# Patient Record
Sex: MALE | Race: OTHER | NOT HISPANIC OR LATINO | URBAN - METROPOLITAN AREA
[De-identification: names, ages, dates, MRNs, and addresses within clinical notes are randomized per-mention and may not be internally consistent; named-entity substitution may affect disease eponyms.]

---

## 2018-11-09 VITALS
HEIGHT: 67 IN | TEMPERATURE: 98 F | HEART RATE: 75 BPM | SYSTOLIC BLOOD PRESSURE: 129 MMHG | DIASTOLIC BLOOD PRESSURE: 69 MMHG | RESPIRATION RATE: 16 BRPM | WEIGHT: 179.02 LBS | OXYGEN SATURATION: 93 %

## 2018-11-09 NOTE — H&P ADULT - NSHPLABSRESULTS_GEN_ALL_CORE
EKG: EKG: NSR @ 70 BPM with TWI in AVF                          15.0   7.6   )-----------( 239      ( 12 Nov 2018 16:22 )             43.7 EKG: NSR @ 70 BPM with TWI in AVF                          15.0   7.6   )-----------( 239      ( 12 Nov 2018 16:22 )             43.7    11-12    137  |  97  |  22  ----------------------------<  120<H>  4.0   |  26  |  1.20    Ca    9.5      12 Nov 2018 16:22    TPro  7.7  /  Alb  4.3  /  TBili  0.4  /  DBili  <0.2  /  AST  12  /  ALT  14  /  AlkPhos  66  11-12      PT/INR - ( 12 Nov 2018 16:22 )   PT: 11.5 sec;   INR: 1.02          PTT - ( 12 Nov 2018 16:22 )  PTT:34.0 sec    CARDIAC MARKERS ( 12 Nov 2018 16:22 )  x     / x     / 71 U/L / x     / 1.2 ng/mL

## 2018-11-09 NOTE — H&P ADULT - HISTORY OF PRESENT ILLNESS
**SKELETON**    68 year old non-compliant M with PMHx HTN, Hyperlipidemia, DM Type II who presented to cardiologist Dr. Jerry c/o MAGGIE.     Echocardiogram 9/18 revealed estimated LVEF 55-60%, Grade 1 Diastolic Dysfunction, mild left atrial enlargement, nodular calcification of the aortic valve, no AS, no AR.     Stress Echo 10/2018 revealed hypokinesis in the mid -basal anterolateral wall at peak stress, horizontal 1mm ST depression in the inferolateral leads, normal LV systolic function. 68 year old non-compliant M with PMHx HTN, Hyperlipidemia, DM Type II, h/o CVA in 2007 & 8/2017 without residual deficit who presented to cardiologist Dr. Jerry c/o AVTAR upon ambulation of <1 city block over past year. Pt also endorses intermittent generalized feeling of fatigue and 1 episode of PND 3 weeks ago that resolved after getting up and drinking glass of water. Pt moved from [place] 2/2018. Denies CP, dizziness, diaphoresis, LE edema, orthopnea, syncope, N/V, abdominal pain.    Echocardiogram 9/18 revealed estimated LVEF 55-60%, Grade 1 Diastolic Dysfunction, mild left atrial enlargement, nodular calcification of the aortic valve, no AS, no AR.     Stress Echo 10/2018 revealed hypokinesis in the mid -basal anterolateral wall at peak stress, horizontal 1mm ST depression in the inferolateral leads, normal LV systolic function.     In light of pt's risk factors, CCS Class III Equivalent Sx, pt referred for cardiac cath with possible intervention to r/o underlying CAD.

## 2018-11-09 NOTE — H&P ADULT - NSHPSOCIALHISTORY_GEN_ALL_CORE
Former smoker (Smoked for 1 year, 30 years ago, 20 cigarettes per day)  Denies alcohol/elicit drug use

## 2018-11-09 NOTE — H&P ADULT - PMH
CVA (cerebral vascular accident)    DM (diabetes mellitus)    HLD (hyperlipidemia)    HTN (hypertension)

## 2018-11-12 ENCOUNTER — OUTPATIENT (OUTPATIENT)
Dept: OUTPATIENT SERVICES | Facility: HOSPITAL | Age: 68
LOS: 1 days | Discharge: NURSING, REHAB OR LONG-TERM CARE | End: 2018-11-12
Payer: COMMERCIAL

## 2018-11-12 LAB
ALBUMIN SERPL ELPH-MCNC: 4.3 G/DL — SIGNIFICANT CHANGE UP (ref 3.3–5)
ALP SERPL-CCNC: 66 U/L — SIGNIFICANT CHANGE UP (ref 40–120)
ALT FLD-CCNC: 14 U/L — SIGNIFICANT CHANGE UP (ref 10–45)
ANION GAP SERPL CALC-SCNC: 14 MMOL/L — SIGNIFICANT CHANGE UP (ref 5–17)
APTT BLD: 34 SEC — SIGNIFICANT CHANGE UP (ref 27.5–36.3)
AST SERPL-CCNC: 12 U/L — SIGNIFICANT CHANGE UP (ref 10–40)
BASOPHILS NFR BLD AUTO: 0.4 % — SIGNIFICANT CHANGE UP (ref 0–2)
BILIRUB DIRECT SERPL-MCNC: <0.2 MG/DL — SIGNIFICANT CHANGE UP (ref 0–0.2)
BILIRUB INDIRECT FLD-MCNC: >0.2 MG/DL — SIGNIFICANT CHANGE UP (ref 0.2–1)
BILIRUB SERPL-MCNC: 0.4 MG/DL — SIGNIFICANT CHANGE UP (ref 0.2–1.2)
BLD GP AB SCN SERPL QL: NEGATIVE — SIGNIFICANT CHANGE UP
BUN SERPL-MCNC: 22 MG/DL — SIGNIFICANT CHANGE UP (ref 7–23)
CALCIUM SERPL-MCNC: 9.5 MG/DL — SIGNIFICANT CHANGE UP (ref 8.4–10.5)
CHLORIDE SERPL-SCNC: 97 MMOL/L — SIGNIFICANT CHANGE UP (ref 96–108)
CHOLEST SERPL-MCNC: 136 MG/DL — SIGNIFICANT CHANGE UP (ref 10–199)
CK MB CFR SERPL CALC: 1.2 NG/ML — SIGNIFICANT CHANGE UP (ref 0–6.7)
CK SERPL-CCNC: 71 U/L — SIGNIFICANT CHANGE UP (ref 30–200)
CO2 SERPL-SCNC: 26 MMOL/L — SIGNIFICANT CHANGE UP (ref 22–31)
CREAT SERPL-MCNC: 1.2 MG/DL — SIGNIFICANT CHANGE UP (ref 0.5–1.3)
CRP SERPL-MCNC: 0.24 MG/DL — SIGNIFICANT CHANGE UP (ref 0–0.4)
EOSINOPHIL NFR BLD AUTO: 3.4 % — SIGNIFICANT CHANGE UP (ref 0–6)
GLUCOSE SERPL-MCNC: 120 MG/DL — HIGH (ref 70–99)
HBA1C BLD-MCNC: 10.3 % — HIGH (ref 4–5.6)
HCT VFR BLD CALC: 43.7 % — SIGNIFICANT CHANGE UP (ref 39–50)
HDLC SERPL-MCNC: 41 MG/DL — SIGNIFICANT CHANGE UP
HGB BLD-MCNC: 15 G/DL — SIGNIFICANT CHANGE UP (ref 13–17)
INR BLD: 1.02 — SIGNIFICANT CHANGE UP (ref 0.88–1.16)
LIPID PNL WITH DIRECT LDL SERPL: 58 MG/DL — SIGNIFICANT CHANGE UP
LYMPHOCYTES # BLD AUTO: 23.7 % — SIGNIFICANT CHANGE UP (ref 13–44)
MCHC RBC-ENTMCNC: 29.9 PG — SIGNIFICANT CHANGE UP (ref 27–34)
MCHC RBC-ENTMCNC: 34.3 G/DL — SIGNIFICANT CHANGE UP (ref 32–36)
MCV RBC AUTO: 87.2 FL — SIGNIFICANT CHANGE UP (ref 80–100)
MONOCYTES NFR BLD AUTO: 11.5 % — SIGNIFICANT CHANGE UP (ref 2–14)
NEUTROPHILS NFR BLD AUTO: 61 % — SIGNIFICANT CHANGE UP (ref 43–77)
PLATELET # BLD AUTO: 239 K/UL — SIGNIFICANT CHANGE UP (ref 150–400)
POTASSIUM SERPL-MCNC: 4 MMOL/L — SIGNIFICANT CHANGE UP (ref 3.5–5.3)
POTASSIUM SERPL-SCNC: 4 MMOL/L — SIGNIFICANT CHANGE UP (ref 3.5–5.3)
PROT SERPL-MCNC: 7.7 G/DL — SIGNIFICANT CHANGE UP (ref 6–8.3)
PROTHROM AB SERPL-ACNC: 11.5 SEC — SIGNIFICANT CHANGE UP (ref 10–12.9)
RBC # BLD: 5.01 M/UL — SIGNIFICANT CHANGE UP (ref 4.2–5.8)
RBC # FLD: 12.2 % — SIGNIFICANT CHANGE UP (ref 10.3–16.9)
RH IG SCN BLD-IMP: POSITIVE — SIGNIFICANT CHANGE UP
SODIUM SERPL-SCNC: 137 MMOL/L — SIGNIFICANT CHANGE UP (ref 135–145)
TOTAL CHOLESTEROL/HDL RATIO MEASUREMENT: 3.3 RATIO — LOW (ref 3.4–9.6)
TRIGL SERPL-MCNC: 187 MG/DL — HIGH (ref 10–149)
WBC # BLD: 7.6 K/UL — SIGNIFICANT CHANGE UP (ref 3.8–10.5)
WBC # FLD AUTO: 7.6 K/UL — SIGNIFICANT CHANGE UP (ref 3.8–10.5)

## 2018-11-12 PROCEDURE — 93010 ELECTROCARDIOGRAM REPORT: CPT

## 2018-11-12 PROCEDURE — 93880 EXTRACRANIAL BILAT STUDY: CPT | Mod: 26

## 2018-11-12 PROCEDURE — 71045 X-RAY EXAM CHEST 1 VIEW: CPT | Mod: 26

## 2018-11-12 PROCEDURE — 93306 TTE W/DOPPLER COMPLETE: CPT | Mod: 26

## 2018-11-12 RX ORDER — SODIUM CHLORIDE 9 MG/ML
500 INJECTION INTRAMUSCULAR; INTRAVENOUS; SUBCUTANEOUS
Qty: 0 | Refills: 0 | Status: DISCONTINUED | OUTPATIENT
Start: 2018-11-12 | End: 2018-11-12

## 2018-11-12 RX ORDER — METOPROLOL TARTRATE 50 MG
1 TABLET ORAL
Qty: 0 | Refills: 0 | COMMUNITY

## 2018-11-12 RX ORDER — ASPIRIN/CALCIUM CARB/MAGNESIUM 324 MG
1 TABLET ORAL
Qty: 0 | Refills: 0 | COMMUNITY

## 2018-11-12 RX ORDER — ASPIRIN/CALCIUM CARB/MAGNESIUM 324 MG
81 TABLET ORAL ONCE
Qty: 0 | Refills: 0 | Status: COMPLETED | OUTPATIENT
Start: 2018-11-12 | End: 2018-11-12

## 2018-11-12 RX ORDER — CLOPIDOGREL BISULFATE 75 MG/1
600 TABLET, FILM COATED ORAL ONCE
Qty: 0 | Refills: 0 | Status: COMPLETED | OUTPATIENT
Start: 2018-11-12 | End: 2018-11-12

## 2018-11-12 RX ORDER — INSULIN LISPRO 100/ML
VIAL (ML) SUBCUTANEOUS ONCE
Qty: 0 | Refills: 0 | Status: DISCONTINUED | OUTPATIENT
Start: 2018-11-12 | End: 2018-11-12

## 2018-11-12 RX ORDER — SODIUM CHLORIDE 9 MG/ML
1000 INJECTION, SOLUTION INTRAVENOUS
Qty: 0 | Refills: 0 | Status: DISCONTINUED | OUTPATIENT
Start: 2018-11-12 | End: 2018-11-12

## 2018-11-12 RX ORDER — LOSARTAN/HYDROCHLOROTHIAZIDE 100MG-25MG
1 TABLET ORAL
Qty: 0 | Refills: 0 | COMMUNITY

## 2018-11-12 RX ADMIN — SODIUM CHLORIDE 75 MILLILITER(S): 9 INJECTION INTRAMUSCULAR; INTRAVENOUS; SUBCUTANEOUS at 17:01

## 2018-11-12 RX ADMIN — CLOPIDOGREL BISULFATE 600 MILLIGRAM(S): 75 TABLET, FILM COATED ORAL at 17:00

## 2018-11-12 RX ADMIN — Medication 81 MILLIGRAM(S): at 17:00

## 2018-11-12 NOTE — PROGRESS NOTE ADULT - SUBJECTIVE AND OBJECTIVE BOX
Interventional Cardiology PA SDA Discharge Note    Patient without complaints. Ambulated and voided without difficulties    Afebrile, VSS    Ext:    		  		Right              Radial : no   hematoma,   no  bleeding, dressing; C/D/I      Pulses:    intact RAD to baseline     A/P:  68 year old non-compliant M with PMHx HTN, Hyperlipidemia, DM Type II, h/o CVA in 2007 & 8/2017 without residual deficit who presented to cardiologist Dr. Jerry c/o NOVA upon ambulation of <1 city block over past year. Pt also endorses intermittent generalized feeling of fatigue and 1 episode of PND 3 weeks ago that resolved after getting up and drinking glass of water. Pt moved from [facility] 2/2018. Denies CP, dizziness, diaphoresis, LE edema, orthopnea, syncope, N/V, abdominal pain. Echocardiogram 9/18 revealed estimated LVEF 55-60%, Grade 1 Diastolic Dysfunction, mild left atrial enlargement, nodular calcification of the aortic valve, no AS, no AR. Stress Echo 10/2018 revealed hypokinesis in the mid -basal anterolateral wall at peak stress, horizontal 1mm ST depression in the inferolateral leads, normal LV systolic function. In light of pt's risk factors, CCS Class III Equivalent Sx, pt was referred for cardiac cath with possible intervention to r/o underlying CAD. Patient underwent cardiac cath on 11/12/18 revealed proxLAD 70%, midLAD 80%, distalLAD mild luminal irreg, D1 85%, ostial D2 90%, proxLCX distal  fills via collaterals, RCA mild atherosclerosis. Dr. Gallo called for CTS eval and then patient to be discharged home as discussed with Dr. Jerry.                1.	Stable for discharge as per attending Dr. Jerry after bed rest, pt voids, groin/wrist stable and 30 minutes of ambulation.  2.	Follow-up with PMD/Cardiologist Dr. Jerry on Thursday 11/15  3.	Discharged forms signed and copies in chart

## 2018-11-13 PROBLEM — Z00.00 ENCOUNTER FOR PREVENTIVE HEALTH EXAMINATION: Status: ACTIVE | Noted: 2018-11-13

## 2018-11-13 PROCEDURE — 80061 LIPID PANEL: CPT

## 2018-11-13 PROCEDURE — 86140 C-REACTIVE PROTEIN: CPT

## 2018-11-13 PROCEDURE — 85610 PROTHROMBIN TIME: CPT

## 2018-11-13 PROCEDURE — 80076 HEPATIC FUNCTION PANEL: CPT

## 2018-11-13 PROCEDURE — 86900 BLOOD TYPING SEROLOGIC ABO: CPT

## 2018-11-13 PROCEDURE — 85025 COMPLETE CBC W/AUTO DIFF WBC: CPT

## 2018-11-13 PROCEDURE — C1887: CPT

## 2018-11-13 PROCEDURE — 86850 RBC ANTIBODY SCREEN: CPT

## 2018-11-13 PROCEDURE — 82553 CREATINE MB FRACTION: CPT

## 2018-11-13 PROCEDURE — 93306 TTE W/DOPPLER COMPLETE: CPT

## 2018-11-13 PROCEDURE — 93880 EXTRACRANIAL BILAT STUDY: CPT

## 2018-11-13 PROCEDURE — 80048 BASIC METABOLIC PNL TOTAL CA: CPT

## 2018-11-13 PROCEDURE — 71045 X-RAY EXAM CHEST 1 VIEW: CPT

## 2018-11-13 PROCEDURE — C1769: CPT

## 2018-11-13 PROCEDURE — 83036 HEMOGLOBIN GLYCOSYLATED A1C: CPT

## 2018-11-13 PROCEDURE — 93458 L HRT ARTERY/VENTRICLE ANGIO: CPT

## 2018-11-13 PROCEDURE — 85730 THROMBOPLASTIN TIME PARTIAL: CPT

## 2018-11-13 PROCEDURE — 82550 ASSAY OF CK (CPK): CPT

## 2018-11-13 PROCEDURE — 82962 GLUCOSE BLOOD TEST: CPT

## 2018-11-13 PROCEDURE — C1894: CPT

## 2018-11-13 PROCEDURE — 93005 ELECTROCARDIOGRAM TRACING: CPT

## 2018-11-13 PROCEDURE — 86901 BLOOD TYPING SEROLOGIC RH(D): CPT

## 2018-11-14 VITALS — WEIGHT: 179 LBS | BODY MASS INDEX: 28.09 KG/M2 | HEIGHT: 67 IN

## 2018-11-14 PROBLEM — I63.9 CEREBRAL INFARCTION, UNSPECIFIED: Chronic | Status: ACTIVE | Noted: 2018-11-12

## 2018-11-14 PROBLEM — E78.5 HYPERLIPIDEMIA, UNSPECIFIED: Chronic | Status: ACTIVE | Noted: 2018-11-12

## 2018-11-14 PROBLEM — I10 ESSENTIAL (PRIMARY) HYPERTENSION: Chronic | Status: ACTIVE | Noted: 2018-11-12

## 2018-11-14 PROBLEM — I25.10 CAD (CORONARY ARTERY DISEASE): Status: ACTIVE | Noted: 2018-11-14

## 2018-11-14 PROBLEM — E11.9 TYPE 2 DIABETES MELLITUS WITHOUT COMPLICATIONS: Chronic | Status: ACTIVE | Noted: 2018-11-12

## 2018-11-15 VITALS
DIASTOLIC BLOOD PRESSURE: 69 MMHG | RESPIRATION RATE: 18 BRPM | HEART RATE: 75 BPM | TEMPERATURE: 98 F | SYSTOLIC BLOOD PRESSURE: 129 MMHG | OXYGEN SATURATION: 93 % | HEIGHT: 67 IN | WEIGHT: 179.02 LBS

## 2018-11-15 RX ORDER — ATORVASTATIN CALCIUM 20 MG/1
20 TABLET, FILM COATED ORAL
Qty: 30 | Refills: 3 | Status: ACTIVE | COMMUNITY

## 2018-11-15 RX ORDER — MULTIVITAMIN
TABLET ORAL DAILY
Refills: 0 | Status: ACTIVE | COMMUNITY

## 2018-11-15 RX ORDER — ASPIRIN 81 MG
81 TABLET, DELAYED RELEASE (ENTERIC COATED) ORAL DAILY
Qty: 30 | Refills: 0 | Status: ACTIVE | COMMUNITY

## 2018-11-15 NOTE — H&P ADULT - ASSESSMENT
68 year old Male, non-compliant with medical care with a PMHx significant for HTN, Hyperlipidemia, CKD stage 2, poorly controlled DM, CVA( no residual) and class II angina presents with severe 2 vessel CAD. Dr. Gallo reviewed the cardiac cath imaging and reports with the patient and his daughter and discussed the case with Dr. Jerry. Dr. Gallo discussed the risks, benefits and alternatives to surgery. Risks include but not limited to death, heart attack, bleeding, stroke, kidney problems and infection. He quoted a 2-3% operative mortality and complication risk.  He also discussed the various approaches, minimally invasive versus sternotomy. Dr. Gallo feels the patient will benefit and is a candidate for a off pump CABG.  All questions were addressed and patient agrees to proceed with surgery.     Plan:   PST  SDA 11/21  pt instructed to take metoprolol the morning of surgery  instructions provided re antibacterial showers and pt given 3 sponges 68 year old Male, non-compliant with medical care with a PMHx significant for HTN, Hyperlipidemia, CKD stage 2, poorly controlled DM, CVA( no residual) and class II angina presents with severe 2 vessel CAD. Dr. Gallo reviewed the cardiac cath imaging and reports with the patient and his daughter and discussed the case with Dr. Jerry. Dr. Gallo discussed the risks, benefits and alternatives to surgery. Risks include but not limited to death, heart attack, bleeding, stroke, kidney problems and infection. He quoted a 2-3% operative mortality and complication risk.  He also discussed the various approaches, minimally invasive versus sternotomy. Dr. Gallo feels the patient will benefit and is a candidate for a off pump CABG.  All questions were addressed and patient agrees to proceed with surgery.     Plan:   PST  SDA 11/21  pt instructed to take metoprolol the morning of surgery  instructions provided re antibacterial showers and pt given 3 sponges    Upon arrival for the admission and surgical intervention, patient is afebrile and hemodynamically stable.  He denies cheat pain, shortness of breath or syncope episodes.  Patient remains NPO and now prepped for the surgical intervention.

## 2018-11-15 NOTE — H&P ADULT - HISTORY OF PRESENT ILLNESS
68 year old Male, non-compliant with medical care with a PMHx significant for HTN, Hyperlipidemia, CKD stage 2,  poorly controlled DM (HGB A1C 10), h/o CVA in 2007 & 8/2017 without residual deficit. He presented to his cardiologist Dr. Jerry with c/o NOVA upon ambulation of <1 city block over past year. Pt also endorses intermittent generalized feeling of fatigue and 1 episode of PND 3 weeks ago that resolved after getting up and drinking glass of water. Pt moved from [place] 2/2018. Denies CP, dizziness, diaphoresis, LE edema, orthopnea, syncope, N/V, abdominal pain.Echocardiogram 9/18 revealed estimated LVEF 55-60%, Grade 1 Diastolic Dysfunction, mild left atrial enlargement, nodular calcification of the aortic valve, no AS, no AR. Stress Echo 10/2018 revealed hypokinesis in the mid -basal anterolateral wall at peak stress, horizontal 1mm ST depression in the inferolateral leads, normal LV systolic function. 11/12/18 s/p Cardiac cath that revealed severe 2 vessel CAD.  Dr. Gallo was consulted and patient now presents for elective surgery.

## 2018-11-15 NOTE — H&P ADULT - NSHPLABSRESULTS_GEN_ALL_CORE
Hgb A1C = 10.3  creat = 1.20  hct= 43.7  hgb= 15  plt= 239  WBC= 7.6  INR= 1.02  tot alb= 4.3  tot bili= 0.4    11/12/18 Chest xray: clear lungs    11/12/18 EKG: SR, 70 bpm    11/12/18 Carotid US: no evidence of hemodynamically significant stenosis. Incidentally noted is a 0.9cm right thyroid nodule.    11/12/18 Echo: no valvular disease    11/12/18Cardiac Cath: LVEF 55%, 70% proximal LAD, 80% mid LAD, 85% D1, 90% D2,  distal LCX w/ collaterals from LAD.    9/2018 Echocardiogram: estimated LVEF 55-60%, Grade 1 Diastolic Dysfunction, mild left atrial enlargement, nodular calcification of the aortic valve, no AS, no AR.     10/2018 Stress Echo: hypokinesis in the mid -basal anterolateral wall at peak stress, horizontal 1mm ST depression in the inferolateral leads, normal LV systolic function.

## 2018-11-15 NOTE — H&P ADULT - PMH
CKD (chronic kidney disease) stage 2, GFR 60-89 ml/min    CVA (cerebral vascular accident)    DM (diabetes mellitus)    HLD (hyperlipidemia)    HTN (hypertension)

## 2018-11-21 ENCOUNTER — APPOINTMENT (OUTPATIENT)
Dept: CARDIOTHORACIC SURGERY | Facility: HOSPITAL | Age: 68
End: 2018-11-21
Payer: MEDICAID

## 2018-11-21 ENCOUNTER — INPATIENT (INPATIENT)
Facility: HOSPITAL | Age: 68
LOS: 5 days | Discharge: HOME HEALTH | DRG: 236 | End: 2018-11-27
Attending: THORACIC SURGERY (CARDIOTHORACIC VASCULAR SURGERY) | Admitting: THORACIC SURGERY (CARDIOTHORACIC VASCULAR SURGERY)
Payer: MEDICAID

## 2018-11-21 DIAGNOSIS — I25.10 ATHEROSCLEROTIC HEART DISEASE OF NATIVE CORONARY ARTERY W/OUT ANGINA PECTORIS: ICD-10-CM

## 2018-11-21 LAB
ALBUMIN SERPL ELPH-MCNC: 3.9 G/DL — SIGNIFICANT CHANGE UP (ref 3.3–5)
ALP SERPL-CCNC: 42 U/L — SIGNIFICANT CHANGE UP (ref 40–120)
ALT FLD-CCNC: 12 U/L — SIGNIFICANT CHANGE UP (ref 10–45)
ANION GAP SERPL CALC-SCNC: 11 MMOL/L — SIGNIFICANT CHANGE UP (ref 5–17)
ANION GAP SERPL CALC-SCNC: 14 MMOL/L — SIGNIFICANT CHANGE UP (ref 5–17)
ANION GAP SERPL CALC-SCNC: 14 MMOL/L — SIGNIFICANT CHANGE UP (ref 5–17)
APTT BLD: 33.9 SEC — SIGNIFICANT CHANGE UP (ref 27.5–36.3)
APTT BLD: 35.8 SEC — SIGNIFICANT CHANGE UP (ref 27.5–36.3)
APTT BLD: 37.6 SEC — HIGH (ref 27.5–36.3)
AST SERPL-CCNC: 17 U/L — SIGNIFICANT CHANGE UP (ref 10–40)
BASE EXCESS BLDA CALC-SCNC: -3.7 MMOL/L — LOW (ref -2–3)
BASOPHILS NFR BLD AUTO: 0.2 % — SIGNIFICANT CHANGE UP (ref 0–2)
BILIRUB SERPL-MCNC: 0.4 MG/DL — SIGNIFICANT CHANGE UP (ref 0.2–1.2)
BLD GP AB SCN SERPL QL: NEGATIVE — SIGNIFICANT CHANGE UP
BUN SERPL-MCNC: 17 MG/DL — SIGNIFICANT CHANGE UP (ref 7–23)
BUN SERPL-MCNC: 17 MG/DL — SIGNIFICANT CHANGE UP (ref 7–23)
BUN SERPL-MCNC: 18 MG/DL — SIGNIFICANT CHANGE UP (ref 7–23)
CALCIUM SERPL-MCNC: 7.8 MG/DL — LOW (ref 8.4–10.5)
CALCIUM SERPL-MCNC: 8 MG/DL — LOW (ref 8.4–10.5)
CALCIUM SERPL-MCNC: 8.3 MG/DL — LOW (ref 8.4–10.5)
CHLORIDE SERPL-SCNC: 100 MMOL/L — SIGNIFICANT CHANGE UP (ref 96–108)
CHLORIDE SERPL-SCNC: 99 MMOL/L — SIGNIFICANT CHANGE UP (ref 96–108)
CHLORIDE SERPL-SCNC: 99 MMOL/L — SIGNIFICANT CHANGE UP (ref 96–108)
CO2 SERPL-SCNC: 20 MMOL/L — LOW (ref 22–31)
CO2 SERPL-SCNC: 21 MMOL/L — LOW (ref 22–31)
CO2 SERPL-SCNC: 23 MMOL/L — SIGNIFICANT CHANGE UP (ref 22–31)
CREAT SERPL-MCNC: 1.02 MG/DL — SIGNIFICANT CHANGE UP (ref 0.5–1.3)
CREAT SERPL-MCNC: 1.05 MG/DL — SIGNIFICANT CHANGE UP (ref 0.5–1.3)
CREAT SERPL-MCNC: 1.06 MG/DL — SIGNIFICANT CHANGE UP (ref 0.5–1.3)
EOSINOPHIL NFR BLD AUTO: 0.8 % — SIGNIFICANT CHANGE UP (ref 0–6)
GAS PNL BLDA: SIGNIFICANT CHANGE UP
GLUCOSE BLDC GLUCOMTR-MCNC: 131 MG/DL — HIGH (ref 70–99)
GLUCOSE BLDC GLUCOMTR-MCNC: 195 MG/DL — HIGH (ref 70–99)
GLUCOSE BLDC GLUCOMTR-MCNC: 205 MG/DL — HIGH (ref 70–99)
GLUCOSE BLDC GLUCOMTR-MCNC: 214 MG/DL — HIGH (ref 70–99)
GLUCOSE BLDC GLUCOMTR-MCNC: 235 MG/DL — HIGH (ref 70–99)
GLUCOSE BLDC GLUCOMTR-MCNC: 238 MG/DL — HIGH (ref 70–99)
GLUCOSE BLDC GLUCOMTR-MCNC: 242 MG/DL — HIGH (ref 70–99)
GLUCOSE SERPL-MCNC: 209 MG/DL — HIGH (ref 70–99)
GLUCOSE SERPL-MCNC: 220 MG/DL — HIGH (ref 70–99)
GLUCOSE SERPL-MCNC: 264 MG/DL — HIGH (ref 70–99)
HCO3 BLDA-SCNC: 21 MMOL/L — SIGNIFICANT CHANGE UP (ref 21–28)
HCT VFR BLD CALC: 30.7 % — LOW (ref 39–50)
HCT VFR BLD CALC: 36.9 % — LOW (ref 39–50)
HCT VFR BLD CALC: 37.9 % — LOW (ref 39–50)
HGB BLD-MCNC: 10.5 G/DL — LOW (ref 13–17)
HGB BLD-MCNC: 12.5 G/DL — LOW (ref 13–17)
HGB BLD-MCNC: 12.8 G/DL — LOW (ref 13–17)
INR BLD: 1.14 — SIGNIFICANT CHANGE UP (ref 0.88–1.16)
INR BLD: 1.15 — SIGNIFICANT CHANGE UP (ref 0.88–1.16)
INR BLD: 1.19 — HIGH (ref 0.88–1.16)
LACTATE SERPL-SCNC: 4 MMOL/L — CRITICAL HIGH (ref 0.5–2)
LYMPHOCYTES # BLD AUTO: 7.4 % — LOW (ref 13–44)
MAGNESIUM SERPL-MCNC: 1.5 MG/DL — LOW (ref 1.6–2.6)
MAGNESIUM SERPL-MCNC: 2.6 MG/DL — SIGNIFICANT CHANGE UP (ref 1.6–2.6)
MCHC RBC-ENTMCNC: 29.4 PG — SIGNIFICANT CHANGE UP (ref 27–34)
MCHC RBC-ENTMCNC: 29.6 PG — SIGNIFICANT CHANGE UP (ref 27–34)
MCHC RBC-ENTMCNC: 29.9 PG — SIGNIFICANT CHANGE UP (ref 27–34)
MCHC RBC-ENTMCNC: 33.8 G/DL — SIGNIFICANT CHANGE UP (ref 32–36)
MCHC RBC-ENTMCNC: 33.9 G/DL — SIGNIFICANT CHANGE UP (ref 32–36)
MCHC RBC-ENTMCNC: 34.2 G/DL — SIGNIFICANT CHANGE UP (ref 32–36)
MCV RBC AUTO: 86.9 FL — SIGNIFICANT CHANGE UP (ref 80–100)
MCV RBC AUTO: 87.4 FL — SIGNIFICANT CHANGE UP (ref 80–100)
MCV RBC AUTO: 87.5 FL — SIGNIFICANT CHANGE UP (ref 80–100)
MONOCYTES NFR BLD AUTO: 6.1 % — SIGNIFICANT CHANGE UP (ref 2–14)
NEUTROPHILS NFR BLD AUTO: 85.5 % — HIGH (ref 43–77)
PCO2 BLDA: 37 MMHG — SIGNIFICANT CHANGE UP (ref 35–48)
PH BLDA: 7.38 — SIGNIFICANT CHANGE UP (ref 7.35–7.45)
PHOSPHATE SERPL-MCNC: 2.6 MG/DL — SIGNIFICANT CHANGE UP (ref 2.5–4.5)
PHOSPHATE SERPL-MCNC: 3.2 MG/DL — SIGNIFICANT CHANGE UP (ref 2.5–4.5)
PLATELET # BLD AUTO: 184 K/UL — SIGNIFICANT CHANGE UP (ref 150–400)
PLATELET # BLD AUTO: 218 K/UL — SIGNIFICANT CHANGE UP (ref 150–400)
PLATELET # BLD AUTO: 253 K/UL — SIGNIFICANT CHANGE UP (ref 150–400)
PO2 BLDA: 174 MMHG — HIGH (ref 83–108)
POTASSIUM SERPL-MCNC: 3.9 MMOL/L — SIGNIFICANT CHANGE UP (ref 3.5–5.3)
POTASSIUM SERPL-MCNC: 4 MMOL/L — SIGNIFICANT CHANGE UP (ref 3.5–5.3)
POTASSIUM SERPL-MCNC: SIGNIFICANT CHANGE UP (ref 3.5–5.3)
POTASSIUM SERPL-SCNC: 3.9 MMOL/L — SIGNIFICANT CHANGE UP (ref 3.5–5.3)
POTASSIUM SERPL-SCNC: 4 MMOL/L — SIGNIFICANT CHANGE UP (ref 3.5–5.3)
POTASSIUM SERPL-SCNC: SIGNIFICANT CHANGE UP (ref 3.5–5.3)
PROT SERPL-MCNC: 6.2 G/DL — SIGNIFICANT CHANGE UP (ref 6–8.3)
PROTHROM AB SERPL-ACNC: 12.9 SEC — SIGNIFICANT CHANGE UP (ref 10–12.9)
PROTHROM AB SERPL-ACNC: 13.1 SEC — HIGH (ref 10–12.9)
PROTHROM AB SERPL-ACNC: 13.5 SEC — HIGH (ref 10–12.9)
RBC # BLD: 3.51 M/UL — LOW (ref 4.2–5.8)
RBC # BLD: 4.22 M/UL — SIGNIFICANT CHANGE UP (ref 4.2–5.8)
RBC # BLD: 4.36 M/UL — SIGNIFICANT CHANGE UP (ref 4.2–5.8)
RBC # FLD: 12.1 % — SIGNIFICANT CHANGE UP (ref 10.3–16.9)
RBC # FLD: 12.2 % — SIGNIFICANT CHANGE UP (ref 10.3–16.9)
RBC # FLD: 12.2 % — SIGNIFICANT CHANGE UP (ref 10.3–16.9)
RH IG SCN BLD-IMP: POSITIVE — SIGNIFICANT CHANGE UP
SAO2 % BLDA: 99 % — SIGNIFICANT CHANGE UP (ref 95–100)
SODIUM SERPL-SCNC: 133 MMOL/L — LOW (ref 135–145)
SODIUM SERPL-SCNC: 134 MMOL/L — LOW (ref 135–145)
SODIUM SERPL-SCNC: 134 MMOL/L — LOW (ref 135–145)
WBC # BLD: 13 K/UL — HIGH (ref 3.8–10.5)
WBC # BLD: 17.8 K/UL — HIGH (ref 3.8–10.5)
WBC # BLD: 20.6 K/UL — HIGH (ref 3.8–10.5)
WBC # FLD AUTO: 13 K/UL — HIGH (ref 3.8–10.5)
WBC # FLD AUTO: 17.8 K/UL — HIGH (ref 3.8–10.5)
WBC # FLD AUTO: 20.6 K/UL — HIGH (ref 3.8–10.5)

## 2018-11-21 PROCEDURE — 93010 ELECTROCARDIOGRAM REPORT: CPT | Mod: 77

## 2018-11-21 PROCEDURE — 93010 ELECTROCARDIOGRAM REPORT: CPT

## 2018-11-21 PROCEDURE — 33535 CABG ARTERIAL THREE: CPT

## 2018-11-21 PROCEDURE — 76998 US GUIDE INTRAOP: CPT | Mod: 26,59

## 2018-11-21 PROCEDURE — 99291 CRITICAL CARE FIRST HOUR: CPT

## 2018-11-21 PROCEDURE — 71045 X-RAY EXAM CHEST 1 VIEW: CPT | Mod: 26

## 2018-11-21 PROCEDURE — 33533 CABG ARTERIAL SINGLE: CPT | Mod: AS

## 2018-11-21 PROCEDURE — 76998 US GUIDE INTRAOP: CPT | Mod: 26,AS

## 2018-11-21 PROCEDURE — 33535 CABG ARTERIAL THREE: CPT | Mod: 80

## 2018-11-21 RX ORDER — DEXTROSE 50 % IN WATER 50 %
25 SYRINGE (ML) INTRAVENOUS
Qty: 0 | Refills: 0 | Status: DISCONTINUED | OUTPATIENT
Start: 2018-11-21 | End: 2018-11-27

## 2018-11-21 RX ORDER — PANTOPRAZOLE SODIUM 20 MG/1
40 TABLET, DELAYED RELEASE ORAL DAILY
Qty: 0 | Refills: 0 | Status: DISCONTINUED | OUTPATIENT
Start: 2018-11-21 | End: 2018-11-22

## 2018-11-21 RX ORDER — POTASSIUM CHLORIDE 20 MEQ
20 PACKET (EA) ORAL ONCE
Qty: 0 | Refills: 0 | Status: COMPLETED | OUTPATIENT
Start: 2018-11-21 | End: 2018-11-21

## 2018-11-21 RX ORDER — DEXMEDETOMIDINE HYDROCHLORIDE IN 0.9% SODIUM CHLORIDE 4 UG/ML
0.4 INJECTION INTRAVENOUS
Qty: 200 | Refills: 0 | Status: DISCONTINUED | OUTPATIENT
Start: 2018-11-21 | End: 2018-11-22

## 2018-11-21 RX ORDER — CHLORHEXIDINE GLUCONATE 213 G/1000ML
1 SOLUTION TOPICAL DAILY
Qty: 0 | Refills: 0 | Status: DISCONTINUED | OUTPATIENT
Start: 2018-11-22 | End: 2018-11-24

## 2018-11-21 RX ORDER — CEFAZOLIN SODIUM 1 G
2000 VIAL (EA) INJECTION EVERY 8 HOURS
Qty: 0 | Refills: 0 | Status: COMPLETED | OUTPATIENT
Start: 2018-11-21 | End: 2018-11-23

## 2018-11-21 RX ORDER — INFLUENZA VIRUS VACCINE 15; 15; 15; 15 UG/.5ML; UG/.5ML; UG/.5ML; UG/.5ML
0.5 SUSPENSION INTRAMUSCULAR ONCE
Qty: 0 | Refills: 0 | Status: DISCONTINUED | OUTPATIENT
Start: 2018-11-21 | End: 2018-11-27

## 2018-11-21 RX ORDER — HEPARIN SODIUM 5000 [USP'U]/ML
5000 INJECTION INTRAVENOUS; SUBCUTANEOUS EVERY 8 HOURS
Qty: 0 | Refills: 0 | Status: DISCONTINUED | OUTPATIENT
Start: 2018-11-21 | End: 2018-11-27

## 2018-11-21 RX ORDER — INSULIN HUMAN 100 [IU]/ML
2 INJECTION, SOLUTION SUBCUTANEOUS
Qty: 100 | Refills: 0 | Status: DISCONTINUED | OUTPATIENT
Start: 2018-11-21 | End: 2018-11-22

## 2018-11-21 RX ORDER — ATORVASTATIN CALCIUM 80 MG/1
20 TABLET, FILM COATED ORAL AT BEDTIME
Qty: 0 | Refills: 0 | Status: DISCONTINUED | OUTPATIENT
Start: 2018-11-21 | End: 2018-11-27

## 2018-11-21 RX ORDER — CHLORHEXIDINE GLUCONATE 213 G/1000ML
5 SOLUTION TOPICAL EVERY 4 HOURS
Qty: 0 | Refills: 0 | Status: DISCONTINUED | OUTPATIENT
Start: 2018-11-21 | End: 2018-11-22

## 2018-11-21 RX ORDER — DEXTROSE 50 % IN WATER 50 %
50 SYRINGE (ML) INTRAVENOUS
Qty: 0 | Refills: 0 | Status: DISCONTINUED | OUTPATIENT
Start: 2018-11-21 | End: 2018-11-27

## 2018-11-21 RX ORDER — ASPIRIN/CALCIUM CARB/MAGNESIUM 324 MG
81 TABLET ORAL DAILY
Qty: 0 | Refills: 0 | Status: DISCONTINUED | OUTPATIENT
Start: 2018-11-21 | End: 2018-11-27

## 2018-11-21 RX ORDER — BENZOYL PEROXIDE MICRONIZED 5.8 %
1 TOWELETTE (EA) TOPICAL
Qty: 0 | Refills: 0 | COMMUNITY

## 2018-11-21 RX ORDER — FENTANYL CITRATE 50 UG/ML
12.5 INJECTION INTRAVENOUS ONCE
Qty: 0 | Refills: 0 | Status: DISCONTINUED | OUTPATIENT
Start: 2018-11-21 | End: 2018-11-21

## 2018-11-21 RX ORDER — ALBUMIN HUMAN 25 %
250 VIAL (ML) INTRAVENOUS ONCE
Qty: 0 | Refills: 0 | Status: COMPLETED | OUTPATIENT
Start: 2018-11-21 | End: 2018-11-21

## 2018-11-21 RX ORDER — FENTANYL CITRATE 50 UG/ML
25 INJECTION INTRAVENOUS ONCE
Qty: 0 | Refills: 0 | Status: DISCONTINUED | OUTPATIENT
Start: 2018-11-21 | End: 2018-11-21

## 2018-11-21 RX ORDER — SODIUM CHLORIDE 9 MG/ML
1000 INJECTION INTRAMUSCULAR; INTRAVENOUS; SUBCUTANEOUS
Qty: 0 | Refills: 0 | Status: DISCONTINUED | OUTPATIENT
Start: 2018-11-21 | End: 2018-11-27

## 2018-11-21 RX ORDER — PROPOFOL 10 MG/ML
5 INJECTION, EMULSION INTRAVENOUS
Qty: 1000 | Refills: 0 | Status: DISCONTINUED | OUTPATIENT
Start: 2018-11-21 | End: 2018-11-22

## 2018-11-21 RX ORDER — CLOPIDOGREL BISULFATE 75 MG/1
75 TABLET, FILM COATED ORAL DAILY
Qty: 0 | Refills: 0 | Status: DISCONTINUED | OUTPATIENT
Start: 2018-11-21 | End: 2018-11-27

## 2018-11-21 RX ORDER — NICARDIPINE HYDROCHLORIDE 30 MG/1
5 CAPSULE, EXTENDED RELEASE ORAL
Qty: 40 | Refills: 0 | Status: DISCONTINUED | OUTPATIENT
Start: 2018-11-21 | End: 2018-11-22

## 2018-11-21 RX ORDER — MEPERIDINE HYDROCHLORIDE 50 MG/ML
25 INJECTION INTRAMUSCULAR; INTRAVENOUS; SUBCUTANEOUS ONCE
Qty: 0 | Refills: 0 | Status: DISCONTINUED | OUTPATIENT
Start: 2018-11-21 | End: 2018-11-22

## 2018-11-21 RX ORDER — ACETAMINOPHEN 500 MG
1000 TABLET ORAL ONCE
Qty: 0 | Refills: 0 | Status: COMPLETED | OUTPATIENT
Start: 2018-11-21 | End: 2018-11-21

## 2018-11-21 RX ORDER — MAGNESIUM SULFATE 500 MG/ML
4 VIAL (ML) INJECTION ONCE
Qty: 0 | Refills: 0 | Status: COMPLETED | OUTPATIENT
Start: 2018-11-21 | End: 2018-11-21

## 2018-11-21 RX ADMIN — Medication 400 MILLIGRAM(S): at 20:30

## 2018-11-21 RX ADMIN — Medication 125 MILLILITER(S): at 18:45

## 2018-11-21 RX ADMIN — Medication 1000 MILLIGRAM(S): at 20:45

## 2018-11-21 RX ADMIN — ATORVASTATIN CALCIUM 20 MILLIGRAM(S): 80 TABLET, FILM COATED ORAL at 22:22

## 2018-11-21 RX ADMIN — NICARDIPINE HYDROCHLORIDE 25 MG/HR: 30 CAPSULE, EXTENDED RELEASE ORAL at 18:10

## 2018-11-21 RX ADMIN — Medication 100 MILLIGRAM(S): at 19:53

## 2018-11-21 RX ADMIN — Medication 100 MILLIEQUIVALENT(S): at 18:46

## 2018-11-21 RX ADMIN — FENTANYL CITRATE 25 MICROGRAM(S): 50 INJECTION INTRAVENOUS at 17:53

## 2018-11-21 RX ADMIN — DEXMEDETOMIDINE HYDROCHLORIDE IN 0.9% SODIUM CHLORIDE 7.94 MICROGRAM(S)/KG/HR: 4 INJECTION INTRAVENOUS at 17:54

## 2018-11-21 RX ADMIN — Medication 100 GRAM(S): at 18:47

## 2018-11-21 RX ADMIN — FENTANYL CITRATE 25 MICROGRAM(S): 50 INJECTION INTRAVENOUS at 18:00

## 2018-11-21 RX ADMIN — DEXMEDETOMIDINE HYDROCHLORIDE IN 0.9% SODIUM CHLORIDE 7.94 MICROGRAM(S)/KG/HR: 4 INJECTION INTRAVENOUS at 21:18

## 2018-11-21 RX ADMIN — FENTANYL CITRATE 25 MICROGRAM(S): 50 INJECTION INTRAVENOUS at 23:42

## 2018-11-21 RX ADMIN — CHLORHEXIDINE GLUCONATE 5 MILLILITER(S): 213 SOLUTION TOPICAL at 22:23

## 2018-11-21 RX ADMIN — INSULIN HUMAN 2 UNIT(S)/HR: 100 INJECTION, SOLUTION SUBCUTANEOUS at 18:00

## 2018-11-21 RX ADMIN — HEPARIN SODIUM 5000 UNIT(S): 5000 INJECTION INTRAVENOUS; SUBCUTANEOUS at 22:24

## 2018-11-21 RX ADMIN — Medication 125 MILLILITER(S): at 22:23

## 2018-11-21 RX ADMIN — FENTANYL CITRATE 12.5 MICROGRAM(S): 50 INJECTION INTRAVENOUS at 16:54

## 2018-11-21 RX ADMIN — FENTANYL CITRATE 12.5 MICROGRAM(S): 50 INJECTION INTRAVENOUS at 17:29

## 2018-11-21 NOTE — PROGRESS NOTE ADULT - SUBJECTIVE AND OBJECTIVE BOX
CTICU  CRITICAL  CARE  attending     Hand off received 					   Pertinent clinical, laboratory, radiographic, hemodynamic, echocardiographic, respiratory data, microbiologic data and chart were reviewed and analyzed frequently throughout the course of the day and night  Patient seen and examined with CTS/ SH attending at bedside    68 year old Male, non-compliant with medical care with a PMHx significant for HTN, Hyperlipidemia, CKD stage 2,  poorly controlled DM (HGB A1C 10), h/o CVA in 2007 & 8/2017 without residual deficit. He presented to his cardiologist Dr. Jerry with c/o NOVA upon ambulation of <1 city block over past year. Pt also endorses intermittent generalized feeling of fatigue and 1 episode of PND 3 weeks ago that resolved after getting up and drinking glass of water. Pt moved from [place] 2/2018. Denies CP, dizziness, diaphoresis, LE edema, orthopnea, syncope, N/V, abdominal pain. Echocardiogram 9/18 revealed estimated LVEF 55-60%, Grade 1 Diastolic Dysfunction, mild left atrial enlargement, nodular calcification of the aortic valve, no AS, no AR. Stress Echo 10/2018 revealed hypokinesis in the mid -basal anterolateral wall at peak stress, horizontal 1mm ST depression in the inferolateral leads, normal LV systolic function. 11/12/18 s/p Cardiac cath that revealed severe 2 vessel CAD.  Dr. Gallo was consulted and patient now presents for elective surgery.       FAMILY HISTORY:  No pertinent family history in first degree relatives  PAST MEDICAL & SURGICAL HISTORY:  CAD (coronary artery disease)  CKD (chronic kidney disease) stage 2, GFR 60-89 ml/min  DM (diabetes mellitus)  CVA (cerebral vascular accident)  HLD (hyperlipidemia)  HTN (hypertension)  No significant past surgical history    Patient is a 68y old  Male who presents with a chief complaint of CAD (15 Nov 2018 10:22)    14 system review was unremarkable  acute changes include acute respiratory failure  Vital signs, hemodynamic and respiratory parameters were reviewed from the bedside nursing flow sheet.  ICU Vital Signs Last 24 Hrs  T(C): 35.4 (21 Nov 2018 16:15), Max: 35.4 (21 Nov 2018 16:15)  T(F): 95.7 (21 Nov 2018 16:15), Max: 95.7 (21 Nov 2018 16:15)  HR: 68 (21 Nov 2018 20:00) (68 - 80)  BP: 148/67 (21 Nov 2018 18:30) (148/67 - 148/67)  BP(mean): 98 (21 Nov 2018 18:30) (98 - 98)  ABP: 110/48 (21 Nov 2018 20:00) (108/48 - 152/66)  ABP(mean): 70 (21 Nov 2018 20:00) (70 - 96)  RR: 12 (21 Nov 2018 20:00) (4 - 21)  SpO2: 100% (21 Nov 2018 20:00) (94% - 100%)    Adult Advanced Hemodynamics Last 24 Hrs  CVP(mm Hg): 12 (21 Nov 2018 20:00) (2 - 13)  CVP(cm H2O): --  CO: --  CI: --  PA: --  PA(mean): --  PCWP: --  SVR: --  SVRI: --  PVR: --  PVRI: --, ABG - ( 21 Nov 2018 18:56 )  pH, Arterial: 7.35  pH, Blood: x     /  pCO2: 38    /  pO2: 179   / HCO3: 20    / Base Excess: -4.9  /  SaO2: 99                Mode: AC/ CMV (Assist Control/ Continuous Mandatory Ventilation)  RR (machine): 8  TV (machine): 700  FiO2: 60  PEEP: 8  ITime: 0.1  MAP: 10  PIP: 23    Intake and output was reviewed and the fluid balance was calculated  Daily     Daily   I&O's Summary    21 Nov 2018 07:01  -  21 Nov 2018 20:14  --------------------------------------------------------  IN: 1091.6 mL / OUT: 500 mL / NET: 591.6 mL        All lines and drain sites were assessed  Glycemic trend was reviewed CAPILLARY BLOOD GLUCOSE      POCT Blood Glucose.: 235 mg/dL (21 Nov 2018 19:59)    NEURO: No acute change in mental status  CVS: S1, S2, No S3.  RESPIRATORY;  Auscultation of the chest reveals equal breath sounds.  GI: Abdomen is soft. No tenderness. + Bowel sounds.  Extremities are warm and well perfused.  VASCULAR: + Distal pulses  Wounds appear clean and unremarkable  Antibiotics are perioperative cefazolin.     labs  CBC Full  -  ( 21 Nov 2018 17:41 )  WBC Count : 20.6 K/uL  Hemoglobin : 12.8 g/dL  Hematocrit : 37.9 %  Platelet Count - Automated : 253 K/uL  Mean Cell Volume : 86.9 fL  Mean Cell Hemoglobin : 29.4 pg  Mean Cell Hemoglobin Concentration : 33.8 g/dL  Auto Neutrophil # : x  Auto Lymphocyte # : x  Auto Monocyte # : x  Auto Eosinophil # : x  Auto Basophil # : x  Auto Neutrophil % : x  Auto Lymphocyte % : x  Auto Monocyte % : x  Auto Eosinophil % : x  Auto Basophil % : x    11-21    134<L>  |  100  |  18  ----------------------------<  264<H>  3.9   |  20<L>  |  1.06    Ca    8.3<L>      21 Nov 2018 17:40  Phos  3.2     11-21  Mg     1.5     11-21      PT/INR - ( 21 Nov 2018 17:40 )   PT: 13.1 sec;   INR: 1.15          PTT - ( 21 Nov 2018 17:40 )  PTT:35.8 sec  The current medications were reviewed   MEDICATIONS  (STANDING):  acetaminophen  IVPB .. 1000 milliGRAM(s) IV Intermittent once  albumin human  5% IVPB 250 milliLiter(s) IV Intermittent once  aspirin  chewable 81 milliGRAM(s) Oral daily  atorvastatin 20 milliGRAM(s) Oral at bedtime  ceFAZolin   IVPB 2000 milliGRAM(s) IV Intermittent every 8 hours  chlorhexidine 0.12% Liquid 5 milliLiter(s) Oral Mucosa every 4 hours  clopidogrel Tablet 75 milliGRAM(s) Oral daily  dexmedetomidine Infusion 0.4 MICROgram(s)/kG/Hr (7.94 mL/Hr) IV Continuous <Continuous>  dextrose 50% Injectable 50 milliLiter(s) IV Push every 15 minutes  dextrose 50% Injectable 25 milliLiter(s) IV Push every 15 minutes  heparin  Injectable 5000 Unit(s) SubCutaneous every 8 hours  influenza   Vaccine 0.5 milliLiter(s) IntraMuscular once  insulin Infusion 2 Unit(s)/Hr (2 mL/Hr) IV Continuous <Continuous>  meperidine     Injectable 25 milliGRAM(s) IV Push once  niCARdipine Infusion 5 mG/Hr (25 mL/Hr) IV Continuous <Continuous>  pantoprazole  Injectable 40 milliGRAM(s) IV Push daily  propofol Infusion 5 MICROgram(s)/kG/Min (2.382 mL/Hr) IV Continuous <Continuous>  sodium chloride 0.9%. 1000 milliLiter(s) (10 mL/Hr) IV Continuous <Continuous>    MEDICATIONS  (PRN):      PROBLEM LIST/ ASSESSMENT:  HEALTH ISSUES - PROBLEM Dx:        Patient is a 68y old  Male who presents with  CAD  S/P CABG  Hemodynamically stable.  Good oxygenation.  Metabolic  acidosis.    Fair urine out put        My plan includes  Close hemodynamic, ventilatory and drain monitoring and management.  Monitor for arrhythmias and monitor parameters for organ perfusion  monitor neurologic status  Head of the bed should remain elevated to 45 deg .   chest PT and IS will be encouraged  monitor adequacy of oxygenation and ventilation and attempt to wean oxygen  Nutritional goals will be met using po eventually , ensure adequate caloric intake and monitor  the same  Stress ulcer and VTE prophylaxis will be achieved    OPTIMIZE Glycemic control.  Electrolytes have been repleted as necessary and wound care has been carried out. Pain control has been achieved.   Aggressive  physical therapy and early mobility and ambulation goals will be met   The family was updated about the course and plan  CRITICAL CARE TIME SPENT in evaluation and management, reassessments, review and interpretation of labs and x-rays, ventilator and hemodynamic management, formulating a plan and coordinating care: ___60____ MIN.  Time does not include procedural time.  CTICU ATTENDING     					    Sunny Romero MD

## 2018-11-21 NOTE — BRIEF OPERATIVE NOTE - PROCEDURE
<<-----Click on this checkbox to enter Procedure CABG x 3  11/21/2018  LIMA to LAD  sequential radial to OM and ramus  Active  [id number]

## 2018-11-21 NOTE — PROVIDER CONTACT NOTE (EICU) - ACTION/TREATMENT ORDERED:
No repeat lactic acid needed as per MD Davidsonzvi. will f/u another full set of lab including lactic acid around 2AM as per MD Nick.

## 2018-11-22 LAB
ALBUMIN SERPL ELPH-MCNC: 3.9 G/DL — SIGNIFICANT CHANGE UP (ref 3.3–5)
ALBUMIN SERPL ELPH-MCNC: 4.4 G/DL — SIGNIFICANT CHANGE UP (ref 3.3–5)
ALP SERPL-CCNC: 37 U/L — LOW (ref 40–120)
ALP SERPL-CCNC: 44 U/L — SIGNIFICANT CHANGE UP (ref 40–120)
ALT FLD-CCNC: 14 U/L — SIGNIFICANT CHANGE UP (ref 10–45)
ALT FLD-CCNC: 9 U/L — LOW (ref 10–45)
ANION GAP SERPL CALC-SCNC: 11 MMOL/L — SIGNIFICANT CHANGE UP (ref 5–17)
ANION GAP SERPL CALC-SCNC: 11 MMOL/L — SIGNIFICANT CHANGE UP (ref 5–17)
ANION GAP SERPL CALC-SCNC: 12 MMOL/L — SIGNIFICANT CHANGE UP (ref 5–17)
ANION GAP SERPL CALC-SCNC: 12 MMOL/L — SIGNIFICANT CHANGE UP (ref 5–17)
ANION GAP SERPL CALC-SCNC: 8 MMOL/L — SIGNIFICANT CHANGE UP (ref 5–17)
APTT BLD: 33.5 SEC — SIGNIFICANT CHANGE UP (ref 27.5–36.3)
APTT BLD: 35.8 SEC — SIGNIFICANT CHANGE UP (ref 27.5–36.3)
APTT BLD: 36.2 SEC — SIGNIFICANT CHANGE UP (ref 27.5–36.3)
APTT BLD: 40.3 SEC — HIGH (ref 27.5–36.3)
AST SERPL-CCNC: 21 U/L — SIGNIFICANT CHANGE UP (ref 10–40)
AST SERPL-CCNC: 21 U/L — SIGNIFICANT CHANGE UP (ref 10–40)
BASOPHILS NFR BLD AUTO: 0.1 % — SIGNIFICANT CHANGE UP (ref 0–2)
BILIRUB SERPL-MCNC: 0.4 MG/DL — SIGNIFICANT CHANGE UP (ref 0.2–1.2)
BILIRUB SERPL-MCNC: 0.5 MG/DL — SIGNIFICANT CHANGE UP (ref 0.2–1.2)
BUN SERPL-MCNC: 14 MG/DL — SIGNIFICANT CHANGE UP (ref 7–23)
BUN SERPL-MCNC: 15 MG/DL — SIGNIFICANT CHANGE UP (ref 7–23)
BUN SERPL-MCNC: 16 MG/DL — SIGNIFICANT CHANGE UP (ref 7–23)
CALCIUM SERPL-MCNC: 8.2 MG/DL — LOW (ref 8.4–10.5)
CALCIUM SERPL-MCNC: 8.3 MG/DL — LOW (ref 8.4–10.5)
CALCIUM SERPL-MCNC: 8.4 MG/DL — SIGNIFICANT CHANGE UP (ref 8.4–10.5)
CALCIUM SERPL-MCNC: 8.6 MG/DL — SIGNIFICANT CHANGE UP (ref 8.4–10.5)
CALCIUM SERPL-MCNC: 8.6 MG/DL — SIGNIFICANT CHANGE UP (ref 8.4–10.5)
CHLORIDE SERPL-SCNC: 100 MMOL/L — SIGNIFICANT CHANGE UP (ref 96–108)
CHLORIDE SERPL-SCNC: 100 MMOL/L — SIGNIFICANT CHANGE UP (ref 96–108)
CHLORIDE SERPL-SCNC: 101 MMOL/L — SIGNIFICANT CHANGE UP (ref 96–108)
CHLORIDE SERPL-SCNC: 98 MMOL/L — SIGNIFICANT CHANGE UP (ref 96–108)
CHLORIDE SERPL-SCNC: 98 MMOL/L — SIGNIFICANT CHANGE UP (ref 96–108)
CO2 SERPL-SCNC: 22 MMOL/L — SIGNIFICANT CHANGE UP (ref 22–31)
CO2 SERPL-SCNC: 23 MMOL/L — SIGNIFICANT CHANGE UP (ref 22–31)
CO2 SERPL-SCNC: 23 MMOL/L — SIGNIFICANT CHANGE UP (ref 22–31)
CO2 SERPL-SCNC: 24 MMOL/L — SIGNIFICANT CHANGE UP (ref 22–31)
CO2 SERPL-SCNC: 27 MMOL/L — SIGNIFICANT CHANGE UP (ref 22–31)
CREAT SERPL-MCNC: 0.84 MG/DL — SIGNIFICANT CHANGE UP (ref 0.5–1.3)
CREAT SERPL-MCNC: 0.97 MG/DL — SIGNIFICANT CHANGE UP (ref 0.5–1.3)
CREAT SERPL-MCNC: 1.01 MG/DL — SIGNIFICANT CHANGE UP (ref 0.5–1.3)
CREAT SERPL-MCNC: 1.03 MG/DL — SIGNIFICANT CHANGE UP (ref 0.5–1.3)
CREAT SERPL-MCNC: 1.18 MG/DL — SIGNIFICANT CHANGE UP (ref 0.5–1.3)
GAS PNL BLDA: SIGNIFICANT CHANGE UP
GLUCOSE BLDC GLUCOMTR-MCNC: 121 MG/DL — HIGH (ref 70–99)
GLUCOSE BLDC GLUCOMTR-MCNC: 126 MG/DL — HIGH (ref 70–99)
GLUCOSE BLDC GLUCOMTR-MCNC: 128 MG/DL — HIGH (ref 70–99)
GLUCOSE BLDC GLUCOMTR-MCNC: 139 MG/DL — HIGH (ref 70–99)
GLUCOSE BLDC GLUCOMTR-MCNC: 151 MG/DL — HIGH (ref 70–99)
GLUCOSE BLDC GLUCOMTR-MCNC: 165 MG/DL — HIGH (ref 70–99)
GLUCOSE BLDC GLUCOMTR-MCNC: 167 MG/DL — HIGH (ref 70–99)
GLUCOSE BLDC GLUCOMTR-MCNC: 168 MG/DL — HIGH (ref 70–99)
GLUCOSE BLDC GLUCOMTR-MCNC: 175 MG/DL — HIGH (ref 70–99)
GLUCOSE BLDC GLUCOMTR-MCNC: 191 MG/DL — HIGH (ref 70–99)
GLUCOSE BLDC GLUCOMTR-MCNC: 196 MG/DL — HIGH (ref 70–99)
GLUCOSE SERPL-MCNC: 152 MG/DL — HIGH (ref 70–99)
GLUCOSE SERPL-MCNC: 155 MG/DL — HIGH (ref 70–99)
GLUCOSE SERPL-MCNC: 167 MG/DL — HIGH (ref 70–99)
GLUCOSE SERPL-MCNC: 172 MG/DL — HIGH (ref 70–99)
GLUCOSE SERPL-MCNC: 184 MG/DL — HIGH (ref 70–99)
HCT VFR BLD CALC: 30.1 % — LOW (ref 39–50)
HCT VFR BLD CALC: 30.6 % — LOW (ref 39–50)
HCT VFR BLD CALC: 31.6 % — LOW (ref 39–50)
HCT VFR BLD CALC: 33.3 % — LOW (ref 39–50)
HGB BLD-MCNC: 10 G/DL — LOW (ref 13–17)
HGB BLD-MCNC: 10 G/DL — LOW (ref 13–17)
HGB BLD-MCNC: 10.7 G/DL — LOW (ref 13–17)
HGB BLD-MCNC: 11.2 G/DL — LOW (ref 13–17)
INR BLD: 1.17 — HIGH (ref 0.88–1.16)
INR BLD: 1.25 — HIGH (ref 0.88–1.16)
INR BLD: 1.34 — HIGH (ref 0.88–1.16)
INR BLD: 1.35 — HIGH (ref 0.88–1.16)
LACTATE SERPL-SCNC: 1.5 MMOL/L — SIGNIFICANT CHANGE UP (ref 0.5–2)
LACTATE SERPL-SCNC: 1.6 MMOL/L — SIGNIFICANT CHANGE UP (ref 0.5–2)
LACTATE SERPL-SCNC: 1.9 MMOL/L — SIGNIFICANT CHANGE UP (ref 0.5–2)
LACTATE SERPL-SCNC: 3.2 MMOL/L — HIGH (ref 0.5–2)
LYMPHOCYTES # BLD AUTO: 5.4 % — LOW (ref 13–44)
MAGNESIUM SERPL-MCNC: 2 MG/DL — SIGNIFICANT CHANGE UP (ref 1.6–2.6)
MAGNESIUM SERPL-MCNC: 2.1 MG/DL — SIGNIFICANT CHANGE UP (ref 1.6–2.6)
MAGNESIUM SERPL-MCNC: 2.1 MG/DL — SIGNIFICANT CHANGE UP (ref 1.6–2.6)
MAGNESIUM SERPL-MCNC: 2.3 MG/DL — SIGNIFICANT CHANGE UP (ref 1.6–2.6)
MCHC RBC-ENTMCNC: 28.6 PG — SIGNIFICANT CHANGE UP (ref 27–34)
MCHC RBC-ENTMCNC: 29.1 PG — SIGNIFICANT CHANGE UP (ref 27–34)
MCHC RBC-ENTMCNC: 29.2 PG — SIGNIFICANT CHANGE UP (ref 27–34)
MCHC RBC-ENTMCNC: 29.4 PG — SIGNIFICANT CHANGE UP (ref 27–34)
MCHC RBC-ENTMCNC: 32.7 G/DL — SIGNIFICANT CHANGE UP (ref 32–36)
MCHC RBC-ENTMCNC: 33.2 G/DL — SIGNIFICANT CHANGE UP (ref 32–36)
MCHC RBC-ENTMCNC: 33.6 G/DL — SIGNIFICANT CHANGE UP (ref 32–36)
MCHC RBC-ENTMCNC: 33.9 G/DL — SIGNIFICANT CHANGE UP (ref 32–36)
MCV RBC AUTO: 85.9 FL — SIGNIFICANT CHANGE UP (ref 80–100)
MCV RBC AUTO: 86.9 FL — SIGNIFICANT CHANGE UP (ref 80–100)
MCV RBC AUTO: 87.4 FL — SIGNIFICANT CHANGE UP (ref 80–100)
MCV RBC AUTO: 88.5 FL — SIGNIFICANT CHANGE UP (ref 80–100)
MONOCYTES NFR BLD AUTO: 7.1 % — SIGNIFICANT CHANGE UP (ref 2–14)
NEUTROPHILS NFR BLD AUTO: 87.4 % — HIGH (ref 43–77)
PHOSPHATE SERPL-MCNC: 2.6 MG/DL — SIGNIFICANT CHANGE UP (ref 2.5–4.5)
PHOSPHATE SERPL-MCNC: 2.7 MG/DL — SIGNIFICANT CHANGE UP (ref 2.5–4.5)
PLATELET # BLD AUTO: 174 K/UL — SIGNIFICANT CHANGE UP (ref 150–400)
PLATELET # BLD AUTO: 194 K/UL — SIGNIFICANT CHANGE UP (ref 150–400)
PLATELET # BLD AUTO: 214 K/UL — SIGNIFICANT CHANGE UP (ref 150–400)
PLATELET # BLD AUTO: 214 K/UL — SIGNIFICANT CHANGE UP (ref 150–400)
POTASSIUM SERPL-MCNC: 4 MMOL/L — SIGNIFICANT CHANGE UP (ref 3.5–5.3)
POTASSIUM SERPL-MCNC: 4.1 MMOL/L — SIGNIFICANT CHANGE UP (ref 3.5–5.3)
POTASSIUM SERPL-MCNC: 4.4 MMOL/L — SIGNIFICANT CHANGE UP (ref 3.5–5.3)
POTASSIUM SERPL-MCNC: 4.5 MMOL/L — SIGNIFICANT CHANGE UP (ref 3.5–5.3)
POTASSIUM SERPL-MCNC: SIGNIFICANT CHANGE UP MMOL/L (ref 3.5–5.3)
POTASSIUM SERPL-SCNC: 4 MMOL/L — SIGNIFICANT CHANGE UP (ref 3.5–5.3)
POTASSIUM SERPL-SCNC: 4.1 MMOL/L — SIGNIFICANT CHANGE UP (ref 3.5–5.3)
POTASSIUM SERPL-SCNC: 4.4 MMOL/L — SIGNIFICANT CHANGE UP (ref 3.5–5.3)
POTASSIUM SERPL-SCNC: 4.5 MMOL/L — SIGNIFICANT CHANGE UP (ref 3.5–5.3)
POTASSIUM SERPL-SCNC: SIGNIFICANT CHANGE UP MMOL/L (ref 3.5–5.3)
PROT SERPL-MCNC: 6.1 G/DL — SIGNIFICANT CHANGE UP (ref 6–8.3)
PROT SERPL-MCNC: 6.5 G/DL — SIGNIFICANT CHANGE UP (ref 6–8.3)
PROTHROM AB SERPL-ACNC: 13.3 SEC — HIGH (ref 10–12.9)
PROTHROM AB SERPL-ACNC: 14.2 SEC — HIGH (ref 10–12.9)
PROTHROM AB SERPL-ACNC: 15.3 SEC — HIGH (ref 10–12.9)
PROTHROM AB SERPL-ACNC: 15.4 SEC — HIGH (ref 10–12.9)
RBC # BLD: 3.4 M/UL — LOW (ref 4.2–5.8)
RBC # BLD: 3.5 M/UL — LOW (ref 4.2–5.8)
RBC # BLD: 3.68 M/UL — LOW (ref 4.2–5.8)
RBC # BLD: 3.83 M/UL — LOW (ref 4.2–5.8)
RBC # FLD: 12 % — SIGNIFICANT CHANGE UP (ref 10.3–16.9)
RBC # FLD: 12.4 % — SIGNIFICANT CHANGE UP (ref 10.3–16.9)
RBC # FLD: 12.5 % — SIGNIFICANT CHANGE UP (ref 10.3–16.9)
RBC # FLD: 12.6 % — SIGNIFICANT CHANGE UP (ref 10.3–16.9)
SODIUM SERPL-SCNC: 131 MMOL/L — LOW (ref 135–145)
SODIUM SERPL-SCNC: 133 MMOL/L — LOW (ref 135–145)
SODIUM SERPL-SCNC: 135 MMOL/L — SIGNIFICANT CHANGE UP (ref 135–145)
SODIUM SERPL-SCNC: 135 MMOL/L — SIGNIFICANT CHANGE UP (ref 135–145)
SODIUM SERPL-SCNC: 136 MMOL/L — SIGNIFICANT CHANGE UP (ref 135–145)
WBC # BLD: 11.7 K/UL — HIGH (ref 3.8–10.5)
WBC # BLD: 11.7 K/UL — HIGH (ref 3.8–10.5)
WBC # BLD: 12 K/UL — HIGH (ref 3.8–10.5)
WBC # BLD: 14 K/UL — HIGH (ref 3.8–10.5)
WBC # FLD AUTO: 11.7 K/UL — HIGH (ref 3.8–10.5)
WBC # FLD AUTO: 11.7 K/UL — HIGH (ref 3.8–10.5)
WBC # FLD AUTO: 12 K/UL — HIGH (ref 3.8–10.5)
WBC # FLD AUTO: 14 K/UL — HIGH (ref 3.8–10.5)

## 2018-11-22 PROCEDURE — 99291 CRITICAL CARE FIRST HOUR: CPT

## 2018-11-22 PROCEDURE — 71045 X-RAY EXAM CHEST 1 VIEW: CPT | Mod: 26,59

## 2018-11-22 RX ORDER — INSULIN GLARGINE 100 [IU]/ML
24 INJECTION, SOLUTION SUBCUTANEOUS AT BEDTIME
Qty: 0 | Refills: 0 | Status: DISCONTINUED | OUTPATIENT
Start: 2018-11-22 | End: 2018-11-23

## 2018-11-22 RX ORDER — FUROSEMIDE 40 MG
10 TABLET ORAL ONCE
Qty: 0 | Refills: 0 | Status: COMPLETED | OUTPATIENT
Start: 2018-11-22 | End: 2018-11-22

## 2018-11-22 RX ORDER — ALBUMIN HUMAN 25 %
250 VIAL (ML) INTRAVENOUS ONCE
Qty: 0 | Refills: 0 | Status: COMPLETED | OUTPATIENT
Start: 2018-11-22 | End: 2018-11-22

## 2018-11-22 RX ORDER — SODIUM CHLORIDE 9 MG/ML
1000 INJECTION, SOLUTION INTRAVENOUS
Qty: 0 | Refills: 0 | Status: DISCONTINUED | OUTPATIENT
Start: 2018-11-22 | End: 2018-11-27

## 2018-11-22 RX ORDER — DEXTROSE 50 % IN WATER 50 %
15 SYRINGE (ML) INTRAVENOUS ONCE
Qty: 0 | Refills: 0 | Status: DISCONTINUED | OUTPATIENT
Start: 2018-11-22 | End: 2018-11-27

## 2018-11-22 RX ORDER — FAMOTIDINE 10 MG/ML
20 INJECTION INTRAVENOUS ONCE
Qty: 0 | Refills: 0 | Status: COMPLETED | OUTPATIENT
Start: 2018-11-22 | End: 2018-11-22

## 2018-11-22 RX ORDER — ACETAMINOPHEN 500 MG
650 TABLET ORAL EVERY 6 HOURS
Qty: 0 | Refills: 0 | Status: DISCONTINUED | OUTPATIENT
Start: 2018-11-22 | End: 2018-11-27

## 2018-11-22 RX ORDER — INSULIN LISPRO 100/ML
VIAL (ML) SUBCUTANEOUS
Qty: 0 | Refills: 0 | Status: DISCONTINUED | OUTPATIENT
Start: 2018-11-22 | End: 2018-11-27

## 2018-11-22 RX ORDER — HUMAN INSULIN 100 [IU]/ML
12 INJECTION, SUSPENSION SUBCUTANEOUS ONCE
Qty: 0 | Refills: 0 | Status: COMPLETED | OUTPATIENT
Start: 2018-11-22 | End: 2018-11-22

## 2018-11-22 RX ORDER — LABETALOL HCL 100 MG
5 TABLET ORAL ONCE
Qty: 0 | Refills: 0 | Status: COMPLETED | OUTPATIENT
Start: 2018-11-22 | End: 2018-11-22

## 2018-11-22 RX ORDER — GLUCAGON INJECTION, SOLUTION 0.5 MG/.1ML
1 INJECTION, SOLUTION SUBCUTANEOUS ONCE
Qty: 0 | Refills: 0 | Status: DISCONTINUED | OUTPATIENT
Start: 2018-11-22 | End: 2018-11-27

## 2018-11-22 RX ORDER — PANTOPRAZOLE SODIUM 20 MG/1
40 TABLET, DELAYED RELEASE ORAL
Qty: 0 | Refills: 0 | Status: DISCONTINUED | OUTPATIENT
Start: 2018-11-22 | End: 2018-11-27

## 2018-11-22 RX ORDER — ONDANSETRON 8 MG/1
8 TABLET, FILM COATED ORAL ONCE
Qty: 0 | Refills: 0 | Status: COMPLETED | OUTPATIENT
Start: 2018-11-22 | End: 2018-11-22

## 2018-11-22 RX ORDER — POTASSIUM CHLORIDE 20 MEQ
20 PACKET (EA) ORAL ONCE
Qty: 0 | Refills: 0 | Status: DISCONTINUED | OUTPATIENT
Start: 2018-11-22 | End: 2018-11-22

## 2018-11-22 RX ORDER — HYDROMORPHONE HYDROCHLORIDE 2 MG/ML
0.5 INJECTION INTRAMUSCULAR; INTRAVENOUS; SUBCUTANEOUS ONCE
Qty: 0 | Refills: 0 | Status: DISCONTINUED | OUTPATIENT
Start: 2018-11-22 | End: 2018-11-22

## 2018-11-22 RX ORDER — DOCUSATE SODIUM 100 MG
100 CAPSULE ORAL THREE TIMES A DAY
Qty: 0 | Refills: 0 | Status: DISCONTINUED | OUTPATIENT
Start: 2018-11-22 | End: 2018-11-27

## 2018-11-22 RX ORDER — METOCLOPRAMIDE HCL 10 MG
10 TABLET ORAL ONCE
Qty: 0 | Refills: 0 | Status: COMPLETED | OUTPATIENT
Start: 2018-11-22 | End: 2018-11-22

## 2018-11-22 RX ORDER — SENNA PLUS 8.6 MG/1
2 TABLET ORAL AT BEDTIME
Qty: 0 | Refills: 0 | Status: DISCONTINUED | OUTPATIENT
Start: 2018-11-22 | End: 2018-11-27

## 2018-11-22 RX ORDER — OXYCODONE AND ACETAMINOPHEN 5; 325 MG/1; MG/1
2 TABLET ORAL EVERY 4 HOURS
Qty: 0 | Refills: 0 | Status: DISCONTINUED | OUTPATIENT
Start: 2018-11-22 | End: 2018-11-23

## 2018-11-22 RX ORDER — ACETAMINOPHEN 500 MG
1000 TABLET ORAL ONCE
Qty: 0 | Refills: 0 | Status: COMPLETED | OUTPATIENT
Start: 2018-11-22 | End: 2018-11-22

## 2018-11-22 RX ORDER — POTASSIUM CHLORIDE 20 MEQ
20 PACKET (EA) ORAL ONCE
Qty: 0 | Refills: 0 | Status: COMPLETED | OUTPATIENT
Start: 2018-11-22 | End: 2018-11-22

## 2018-11-22 RX ORDER — POLYETHYLENE GLYCOL 3350 17 G/17G
17 POWDER, FOR SOLUTION ORAL DAILY
Qty: 0 | Refills: 0 | Status: DISCONTINUED | OUTPATIENT
Start: 2018-11-22 | End: 2018-11-27

## 2018-11-22 RX ORDER — OXYCODONE AND ACETAMINOPHEN 5; 325 MG/1; MG/1
1 TABLET ORAL EVERY 4 HOURS
Qty: 0 | Refills: 0 | Status: DISCONTINUED | OUTPATIENT
Start: 2018-11-22 | End: 2018-11-23

## 2018-11-22 RX ORDER — INSULIN LISPRO 100/ML
VIAL (ML) SUBCUTANEOUS
Qty: 0 | Refills: 0 | Status: DISCONTINUED | OUTPATIENT
Start: 2018-11-22 | End: 2018-11-22

## 2018-11-22 RX ORDER — PANTOPRAZOLE SODIUM 20 MG/1
40 TABLET, DELAYED RELEASE ORAL ONCE
Qty: 0 | Refills: 0 | Status: COMPLETED | OUTPATIENT
Start: 2018-11-22 | End: 2018-11-22

## 2018-11-22 RX ORDER — CALCIUM GLUCONATE 100 MG/ML
2 VIAL (ML) INTRAVENOUS ONCE
Qty: 0 | Refills: 0 | Status: COMPLETED | OUTPATIENT
Start: 2018-11-22 | End: 2018-11-22

## 2018-11-22 RX ORDER — INSULIN LISPRO 100/ML
7 VIAL (ML) SUBCUTANEOUS
Qty: 0 | Refills: 0 | Status: DISCONTINUED | OUTPATIENT
Start: 2018-11-22 | End: 2018-11-27

## 2018-11-22 RX ORDER — FOLIC ACID 0.8 MG
1 TABLET ORAL DAILY
Qty: 0 | Refills: 0 | Status: DISCONTINUED | OUTPATIENT
Start: 2018-11-22 | End: 2018-11-27

## 2018-11-22 RX ADMIN — Medication 100 MILLIGRAM(S): at 11:46

## 2018-11-22 RX ADMIN — Medication 1000 MILLIGRAM(S): at 09:48

## 2018-11-22 RX ADMIN — Medication 400 MILLIGRAM(S): at 09:48

## 2018-11-22 RX ADMIN — Medication 10 MILLIGRAM(S): at 01:02

## 2018-11-22 RX ADMIN — HYDROMORPHONE HYDROCHLORIDE 0.5 MILLIGRAM(S): 2 INJECTION INTRAMUSCULAR; INTRAVENOUS; SUBCUTANEOUS at 11:42

## 2018-11-22 RX ADMIN — Medication 100 MILLIGRAM(S): at 04:14

## 2018-11-22 RX ADMIN — Medication 20 MILLIEQUIVALENT(S): at 19:17

## 2018-11-22 RX ADMIN — Medication 100 MILLIGRAM(S): at 19:34

## 2018-11-22 RX ADMIN — Medication 650 MILLIGRAM(S): at 22:50

## 2018-11-22 RX ADMIN — PANTOPRAZOLE SODIUM 40 MILLIGRAM(S): 20 TABLET, DELAYED RELEASE ORAL at 16:17

## 2018-11-22 RX ADMIN — Medication 81 MILLIGRAM(S): at 11:46

## 2018-11-22 RX ADMIN — CHLORHEXIDINE GLUCONATE 5 MILLILITER(S): 213 SOLUTION TOPICAL at 05:16

## 2018-11-22 RX ADMIN — HEPARIN SODIUM 5000 UNIT(S): 5000 INJECTION INTRAVENOUS; SUBCUTANEOUS at 21:55

## 2018-11-22 RX ADMIN — Medication 125 MILLILITER(S): at 09:50

## 2018-11-22 RX ADMIN — HUMAN INSULIN 12 UNIT(S): 100 INJECTION, SUSPENSION SUBCUTANEOUS at 06:50

## 2018-11-22 RX ADMIN — Medication 100 MILLIEQUIVALENT(S): at 03:10

## 2018-11-22 RX ADMIN — FENTANYL CITRATE 25 MICROGRAM(S): 50 INJECTION INTRAVENOUS at 00:00

## 2018-11-22 RX ADMIN — Medication 5 MILLIGRAM(S): at 11:42

## 2018-11-22 RX ADMIN — ONDANSETRON 8 MILLIGRAM(S): 8 TABLET, FILM COATED ORAL at 11:41

## 2018-11-22 RX ADMIN — Medication 2: at 22:19

## 2018-11-22 RX ADMIN — CHLORHEXIDINE GLUCONATE 5 MILLILITER(S): 213 SOLUTION TOPICAL at 11:45

## 2018-11-22 RX ADMIN — Medication 125 MILLILITER(S): at 09:48

## 2018-11-22 RX ADMIN — Medication 7 UNIT(S): at 17:35

## 2018-11-22 RX ADMIN — HEPARIN SODIUM 5000 UNIT(S): 5000 INJECTION INTRAVENOUS; SUBCUTANEOUS at 05:16

## 2018-11-22 RX ADMIN — Medication 100 MILLIGRAM(S): at 21:55

## 2018-11-22 RX ADMIN — CHLORHEXIDINE GLUCONATE 1 APPLICATION(S): 213 SOLUTION TOPICAL at 11:47

## 2018-11-22 RX ADMIN — Medication 1000 MILLIGRAM(S): at 02:45

## 2018-11-22 RX ADMIN — Medication 650 MILLIGRAM(S): at 22:13

## 2018-11-22 RX ADMIN — SENNA PLUS 2 TABLET(S): 8.6 TABLET ORAL at 21:55

## 2018-11-22 RX ADMIN — OXYCODONE AND ACETAMINOPHEN 2 TABLET(S): 5; 325 TABLET ORAL at 20:17

## 2018-11-22 RX ADMIN — OXYCODONE AND ACETAMINOPHEN 2 TABLET(S): 5; 325 TABLET ORAL at 11:47

## 2018-11-22 RX ADMIN — OXYCODONE AND ACETAMINOPHEN 2 TABLET(S): 5; 325 TABLET ORAL at 09:47

## 2018-11-22 RX ADMIN — PANTOPRAZOLE SODIUM 40 MILLIGRAM(S): 20 TABLET, DELAYED RELEASE ORAL at 11:43

## 2018-11-22 RX ADMIN — Medication 10 MILLIGRAM(S): at 11:33

## 2018-11-22 RX ADMIN — Medication 400 MILLIGRAM(S): at 02:37

## 2018-11-22 RX ADMIN — INSULIN GLARGINE 24 UNIT(S): 100 INJECTION, SOLUTION SUBCUTANEOUS at 22:19

## 2018-11-22 RX ADMIN — ATORVASTATIN CALCIUM 20 MILLIGRAM(S): 80 TABLET, FILM COATED ORAL at 21:55

## 2018-11-22 RX ADMIN — Medication 125 MILLILITER(S): at 09:49

## 2018-11-22 RX ADMIN — CHLORHEXIDINE GLUCONATE 5 MILLILITER(S): 213 SOLUTION TOPICAL at 02:15

## 2018-11-22 RX ADMIN — Medication 1: at 17:36

## 2018-11-22 RX ADMIN — OXYCODONE AND ACETAMINOPHEN 2 TABLET(S): 5; 325 TABLET ORAL at 20:50

## 2018-11-22 RX ADMIN — HEPARIN SODIUM 5000 UNIT(S): 5000 INJECTION INTRAVENOUS; SUBCUTANEOUS at 14:00

## 2018-11-22 RX ADMIN — CLOPIDOGREL BISULFATE 75 MILLIGRAM(S): 75 TABLET, FILM COATED ORAL at 11:46

## 2018-11-22 RX ADMIN — FAMOTIDINE 20 MILLIGRAM(S): 10 INJECTION INTRAVENOUS at 11:33

## 2018-11-22 RX ADMIN — Medication 200 GRAM(S): at 05:16

## 2018-11-22 NOTE — PROGRESS NOTE ADULT - SUBJECTIVE AND OBJECTIVE BOX
CTICU  CRITICAL  CARE  attending     Hand off received 					   Pertinent clinical, laboratory, radiographic, hemodynamic, echocardiographic, respiratory data, microbiologic data and chart were reviewed and analyzed frequently throughout the course of the day and night  Patient seen and examined with CTS/ SH attending at bedside  Pt is a 68y , Male, HEALTH ISSUES - PROBLEM Dx:      , FAMILY HISTORY:  No pertinent family history in first degree relatives  PAST MEDICAL & SURGICAL HISTORY:  CAD (coronary artery disease)  CKD (chronic kidney disease) stage 2, GFR 60-89 ml/min  DM (diabetes mellitus)  CVA (cerebral vascular accident)  HLD (hyperlipidemia)  HTN (hypertension)  No significant past surgical history    Patient is a 68y old  Male who presents with a chief complaint of CAD (21 Nov 2018 20:14)      14 system review was unremarkable  acute changes include acute respiratory failure  Vital signs, hemodynamic and respiratory parameters were reviewed from the bedside nursing flowsheet.  ICU Vital Signs Last 24 Hrs  T(C): 36.6 (22 Nov 2018 14:00), Max: 36.6 (22 Nov 2018 14:00)  T(F): 97.8 (22 Nov 2018 14:00), Max: 97.8 (22 Nov 2018 14:00)  HR: 76 (22 Nov 2018 16:00) (62 - 80)  BP: 106/62 (22 Nov 2018 06:00) (106/62 - 148/67)  BP(mean): 81 (22 Nov 2018 06:00) (81 - 98)  ABP: 160/70 (22 Nov 2018 16:00) (104/44 - 160/70)  ABP(mean): 96 (22 Nov 2018 16:00) (62 - 106)  RR: 18 (22 Nov 2018 16:00) (11 - 30)  SpO2: 96% (22 Nov 2018 16:00) (85% - 100%)    Adult Advanced Hemodynamics Last 24 Hrs  CVP(mm Hg): 25 (22 Nov 2018 16:00) (3 - 26)  CVP(cm H2O): --  CO: --  CI: --  PA: --  PA(mean): --  PCWP: --  SVR: --  SVRI: --  PVR: --  PVRI: --, ABG - ( 22 Nov 2018 09:39 )  pH, Arterial: 7.44  pH, Blood: x     /  pCO2: 36    /  pO2: 97    / HCO3: 24    / Base Excess: -0.4  /  SaO2: 98                Mode: CPAP with PS  FiO2: 40  PEEP: 5  PS: 12  MAP: 9  PIP: 18    Intake and output was reviewed and the fluid balance was calculated  Daily     Daily   I&O's Summary    21 Nov 2018 07:01  -  22 Nov 2018 07:00  --------------------------------------------------------  IN: 2032.8 mL / OUT: 2400 mL / NET: -367.2 mL    22 Nov 2018 07:01  -  22 Nov 2018 17:26  --------------------------------------------------------  IN: 1613 mL / OUT: 620 mL / NET: 993 mL        All lines and drain sites were assessed  [facility]      POCT Blood Glucose.: 128 mg/dL (22 Nov 2018 12:06)    No acute change in mental status  Auscultation of the chest reveals equal bs  Abdomen is soft  Extremities are warm and well perfused  Wounds appear clean and unremarkable  Antibiotics are periop    labs  CBC Full  -  ( 22 Nov 2018 15:57 )  WBC Count : 11.7 K/uL  Hemoglobin : 10.0 g/dL  Hematocrit : 30.6 %  Platelet Count - Automated : 174 K/uL  Mean Cell Volume : 87.4 fL  Mean Cell Hemoglobin : 28.6 pg  Mean Cell Hemoglobin Concentration : 32.7 g/dL  Auto Neutrophil # : x  Auto Lymphocyte # : x  Auto Monocyte # : x  Auto Eosinophil # : x  Auto Basophil # : x  Auto Neutrophil % : x  Auto Lymphocyte % : x  Auto Monocyte % : x  Auto Eosinophil % : x  Auto Basophil % : x    11-22    135  |  100  |  14  ----------------------------<  172<H>  4.5   |  24  |  1.03    Ca    8.6      22 Nov 2018 15:57  Phos  2.7     11-22  Mg     2.1     11-22    TPro  6.5  /  Alb  4.4  /  TBili  0.5  /  DBili  x   /  AST  21  /  ALT  14  /  AlkPhos  44  11-22    PT/INR - ( 22 Nov 2018 15:57 )   PT: 15.4 sec;   INR: 1.35          PTT - ( 22 Nov 2018 15:57 )  PTT:40.3 sec  The current medications were reviewed   MEDICATIONS  (STANDING):  aspirin  chewable 81 milliGRAM(s) Oral daily  atorvastatin 20 milliGRAM(s) Oral at bedtime  ceFAZolin   IVPB 2000 milliGRAM(s) IV Intermittent every 8 hours  chlorhexidine 0.12% Liquid 5 milliLiter(s) Oral Mucosa every 4 hours  chlorhexidine 2% Cloths 1 Application(s) Topical daily  clopidogrel Tablet 75 milliGRAM(s) Oral daily  dextrose 5%. 1000 milliLiter(s) (50 mL/Hr) IV Continuous <Continuous>  dextrose 50% Injectable 50 milliLiter(s) IV Push every 15 minutes  dextrose 50% Injectable 25 milliLiter(s) IV Push every 15 minutes  heparin  Injectable 5000 Unit(s) SubCutaneous every 8 hours  influenza   Vaccine 0.5 milliLiter(s) IntraMuscular once  insulin glargine Injectable (LANTUS) 24 Unit(s) SubCutaneous at bedtime  insulin Infusion 2 Unit(s)/Hr (2 mL/Hr) IV Continuous <Continuous>  insulin lispro (HumaLOG) corrective regimen sliding scale   SubCutaneous Before meals and at bedtime  insulin lispro Injectable (HumaLOG) 7 Unit(s) SubCutaneous three times a day before meals  pantoprazole  Injectable 40 milliGRAM(s) IV Push daily  sodium chloride 0.9%. 1000 milliLiter(s) (10 mL/Hr) IV Continuous <Continuous>    MEDICATIONS  (PRN):  dextrose 40% Gel 15 Gram(s) Oral once PRN Blood Glucose LESS THAN 70 milliGRAM(s)/deciliter  glucagon  Injectable 1 milliGRAM(s) IntraMuscular once PRN Glucose LESS THAN 70 milligrams/deciliter  oxyCODONE    5 mG/acetaminophen 325 mG 1 Tablet(s) Oral every 4 hours PRN Moderate Pain (4 - 6)  oxyCODONE    5 mG/acetaminophen 325 mG 2 Tablet(s) Oral every 4 hours PRN Severe Pain (7 - 10)       PROBLEM LIST/ ASSESSMENT:  HEALTH ISSUES - PROBLEM Dx:      ,   Patient is a 68y old  Male who presents with a chief complaint of CAD (21 Nov 2018 20:14)     s/p cardiac surgery      My plan includes :  close hemodynamic, ventilatory and drain monitoring and management per post op routine    Monitor for arrhythmias and monitor parameters for organ perfusion  monitor neurologic status  Head of the bed should remain elevated to 45 deg .   chest PT and IS will be encouraged  monitor adequacy of oxygenation and ventilation and attempt to wean oxygen  Nutritional goals will be met using po eventually , ensure adequate caloric intake and montior the same  Stress ulcer and VTE prophylaxis will be achieved    Glycemic control is satisfactory  Electrolytes have been repleted as necessary and wound care has been carried out. Pain control has been achieved.   agressive physical therapy and early mobility and ambulation goals will be met   The family was updated about the course and plan  CRITICAL CARE TIME SPENT in evaluation and management, reassessments, review and interpretation of labs and x-rays, ventilator and hemodynamic management, formulating a plan and coordinating care: ___90____ MIN.  Time does not include procedural time.  CTICU ATTENDING     					    Derick Campos MD

## 2018-11-23 ENCOUNTER — TRANSCRIPTION ENCOUNTER (OUTPATIENT)
Age: 68
End: 2018-11-23

## 2018-11-23 LAB
ALBUMIN SERPL ELPH-MCNC: 3.8 G/DL — SIGNIFICANT CHANGE UP (ref 3.3–5)
ALP SERPL-CCNC: 40 U/L — SIGNIFICANT CHANGE UP (ref 40–120)
ALT FLD-CCNC: 9 U/L — LOW (ref 10–45)
ANION GAP SERPL CALC-SCNC: 10 MMOL/L — SIGNIFICANT CHANGE UP (ref 5–17)
ANION GAP SERPL CALC-SCNC: 15 MMOL/L — SIGNIFICANT CHANGE UP (ref 5–17)
APTT BLD: 37.2 SEC — HIGH (ref 27.5–36.3)
AST SERPL-CCNC: 22 U/L — SIGNIFICANT CHANGE UP (ref 10–40)
BILIRUB SERPL-MCNC: 0.5 MG/DL — SIGNIFICANT CHANGE UP (ref 0.2–1.2)
BUN SERPL-MCNC: 13 MG/DL — SIGNIFICANT CHANGE UP (ref 7–23)
BUN SERPL-MCNC: 15 MG/DL — SIGNIFICANT CHANGE UP (ref 7–23)
CALCIUM SERPL-MCNC: 8.4 MG/DL — SIGNIFICANT CHANGE UP (ref 8.4–10.5)
CALCIUM SERPL-MCNC: 8.6 MG/DL — SIGNIFICANT CHANGE UP (ref 8.4–10.5)
CHLORIDE SERPL-SCNC: 94 MMOL/L — LOW (ref 96–108)
CHLORIDE SERPL-SCNC: 99 MMOL/L — SIGNIFICANT CHANGE UP (ref 96–108)
CO2 SERPL-SCNC: 25 MMOL/L — SIGNIFICANT CHANGE UP (ref 22–31)
CO2 SERPL-SCNC: 26 MMOL/L — SIGNIFICANT CHANGE UP (ref 22–31)
CREAT SERPL-MCNC: 1.16 MG/DL — SIGNIFICANT CHANGE UP (ref 0.5–1.3)
CREAT SERPL-MCNC: 1.24 MG/DL — SIGNIFICANT CHANGE UP (ref 0.5–1.3)
GLUCOSE BLDC GLUCOMTR-MCNC: 115 MG/DL — HIGH (ref 70–99)
GLUCOSE BLDC GLUCOMTR-MCNC: 150 MG/DL — HIGH (ref 70–99)
GLUCOSE BLDC GLUCOMTR-MCNC: 180 MG/DL — HIGH (ref 70–99)
GLUCOSE BLDC GLUCOMTR-MCNC: 205 MG/DL — HIGH (ref 70–99)
GLUCOSE SERPL-MCNC: 130 MG/DL — HIGH (ref 70–99)
GLUCOSE SERPL-MCNC: 168 MG/DL — HIGH (ref 70–99)
HCT VFR BLD CALC: 29.7 % — LOW (ref 39–50)
HCT VFR BLD CALC: 31.6 % — LOW (ref 39–50)
HGB BLD-MCNC: 10.4 G/DL — LOW (ref 13–17)
HGB BLD-MCNC: 9.8 G/DL — LOW (ref 13–17)
INR BLD: 1.31 — HIGH (ref 0.88–1.16)
LACTATE SERPL-SCNC: 1.2 MMOL/L — SIGNIFICANT CHANGE UP (ref 0.5–2)
MAGNESIUM SERPL-MCNC: 3.2 MG/DL — HIGH (ref 1.6–2.6)
MCHC RBC-ENTMCNC: 29.5 PG — SIGNIFICANT CHANGE UP (ref 27–34)
MCHC RBC-ENTMCNC: 29.5 PG — SIGNIFICANT CHANGE UP (ref 27–34)
MCHC RBC-ENTMCNC: 32.9 G/DL — SIGNIFICANT CHANGE UP (ref 32–36)
MCHC RBC-ENTMCNC: 33 G/DL — SIGNIFICANT CHANGE UP (ref 32–36)
MCV RBC AUTO: 89.5 FL — SIGNIFICANT CHANGE UP (ref 80–100)
MCV RBC AUTO: 89.8 FL — SIGNIFICANT CHANGE UP (ref 80–100)
PHOSPHATE SERPL-MCNC: 2.7 MG/DL — SIGNIFICANT CHANGE UP (ref 2.5–4.5)
PLATELET # BLD AUTO: 206 K/UL — SIGNIFICANT CHANGE UP (ref 150–400)
PLATELET # BLD AUTO: 214 K/UL — SIGNIFICANT CHANGE UP (ref 150–400)
POTASSIUM SERPL-MCNC: 4.3 MMOL/L — SIGNIFICANT CHANGE UP (ref 3.5–5.3)
POTASSIUM SERPL-MCNC: 4.6 MMOL/L — SIGNIFICANT CHANGE UP (ref 3.5–5.3)
POTASSIUM SERPL-SCNC: 4.3 MMOL/L — SIGNIFICANT CHANGE UP (ref 3.5–5.3)
POTASSIUM SERPL-SCNC: 4.6 MMOL/L — SIGNIFICANT CHANGE UP (ref 3.5–5.3)
PROT SERPL-MCNC: 6.1 G/DL — SIGNIFICANT CHANGE UP (ref 6–8.3)
PROTHROM AB SERPL-ACNC: 14.9 SEC — HIGH (ref 10–12.9)
RBC # BLD: 3.32 M/UL — LOW (ref 4.2–5.8)
RBC # BLD: 3.52 M/UL — LOW (ref 4.2–5.8)
RBC # FLD: 12.5 % — SIGNIFICANT CHANGE UP (ref 10.3–16.9)
RBC # FLD: 12.8 % — SIGNIFICANT CHANGE UP (ref 10.3–16.9)
SODIUM SERPL-SCNC: 134 MMOL/L — LOW (ref 135–145)
SODIUM SERPL-SCNC: 135 MMOL/L — SIGNIFICANT CHANGE UP (ref 135–145)
WBC # BLD: 12.5 K/UL — HIGH (ref 3.8–10.5)
WBC # BLD: 13.4 K/UL — HIGH (ref 3.8–10.5)
WBC # FLD AUTO: 12.5 K/UL — HIGH (ref 3.8–10.5)
WBC # FLD AUTO: 13.4 K/UL — HIGH (ref 3.8–10.5)

## 2018-11-23 PROCEDURE — 71045 X-RAY EXAM CHEST 1 VIEW: CPT | Mod: 26

## 2018-11-23 PROCEDURE — 99291 CRITICAL CARE FIRST HOUR: CPT

## 2018-11-23 RX ORDER — FUROSEMIDE 40 MG
20 TABLET ORAL ONCE
Qty: 0 | Refills: 0 | Status: COMPLETED | OUTPATIENT
Start: 2018-11-23 | End: 2018-11-23

## 2018-11-23 RX ORDER — INSULIN GLARGINE 100 [IU]/ML
27 INJECTION, SOLUTION SUBCUTANEOUS AT BEDTIME
Qty: 0 | Refills: 0 | Status: DISCONTINUED | OUTPATIENT
Start: 2018-11-23 | End: 2018-11-26

## 2018-11-23 RX ORDER — METOPROLOL TARTRATE 50 MG
12.5 TABLET ORAL EVERY 8 HOURS
Qty: 0 | Refills: 0 | Status: DISCONTINUED | OUTPATIENT
Start: 2018-11-23 | End: 2018-11-26

## 2018-11-23 RX ORDER — IPRATROPIUM BROMIDE 0.2 MG/ML
500 SOLUTION, NON-ORAL INHALATION EVERY 6 HOURS
Qty: 0 | Refills: 0 | Status: DISCONTINUED | OUTPATIENT
Start: 2018-11-23 | End: 2018-11-27

## 2018-11-23 RX ORDER — ONDANSETRON 8 MG/1
4 TABLET, FILM COATED ORAL ONCE
Qty: 0 | Refills: 0 | Status: COMPLETED | OUTPATIENT
Start: 2018-11-23 | End: 2018-11-23

## 2018-11-23 RX ORDER — TRAMADOL HYDROCHLORIDE 50 MG/1
50 TABLET ORAL EVERY 8 HOURS
Qty: 0 | Refills: 0 | Status: DISCONTINUED | OUTPATIENT
Start: 2018-11-23 | End: 2018-11-27

## 2018-11-23 RX ORDER — MAGNESIUM SULFATE 500 MG/ML
2 VIAL (ML) INJECTION ONCE
Qty: 0 | Refills: 0 | Status: COMPLETED | OUTPATIENT
Start: 2018-11-23 | End: 2018-11-23

## 2018-11-23 RX ADMIN — Medication 100 MILLIGRAM(S): at 14:26

## 2018-11-23 RX ADMIN — CHLORHEXIDINE GLUCONATE 1 APPLICATION(S): 213 SOLUTION TOPICAL at 12:34

## 2018-11-23 RX ADMIN — INSULIN GLARGINE 27 UNIT(S): 100 INJECTION, SOLUTION SUBCUTANEOUS at 22:11

## 2018-11-23 RX ADMIN — Medication 100 MILLIGRAM(S): at 06:53

## 2018-11-23 RX ADMIN — TRAMADOL HYDROCHLORIDE 50 MILLIGRAM(S): 50 TABLET ORAL at 21:44

## 2018-11-23 RX ADMIN — Medication 2: at 11:19

## 2018-11-23 RX ADMIN — Medication 100 MILLIGRAM(S): at 21:43

## 2018-11-23 RX ADMIN — PANTOPRAZOLE SODIUM 40 MILLIGRAM(S): 20 TABLET, DELAYED RELEASE ORAL at 06:53

## 2018-11-23 RX ADMIN — Medication 20 MILLIGRAM(S): at 04:30

## 2018-11-23 RX ADMIN — Medication 12.5 MILLIGRAM(S): at 14:25

## 2018-11-23 RX ADMIN — SENNA PLUS 2 TABLET(S): 8.6 TABLET ORAL at 21:43

## 2018-11-23 RX ADMIN — ONDANSETRON 4 MILLIGRAM(S): 8 TABLET, FILM COATED ORAL at 17:30

## 2018-11-23 RX ADMIN — Medication 7 UNIT(S): at 11:19

## 2018-11-23 RX ADMIN — HEPARIN SODIUM 5000 UNIT(S): 5000 INJECTION INTRAVENOUS; SUBCUTANEOUS at 14:25

## 2018-11-23 RX ADMIN — Medication 81 MILLIGRAM(S): at 12:33

## 2018-11-23 RX ADMIN — Medication 12.5 MILLIGRAM(S): at 09:40

## 2018-11-23 RX ADMIN — Medication 12.5 MILLIGRAM(S): at 21:43

## 2018-11-23 RX ADMIN — Medication 1 TABLET(S): at 12:33

## 2018-11-23 RX ADMIN — Medication 500 MICROGRAM(S): at 11:18

## 2018-11-23 RX ADMIN — Medication 100 MILLIGRAM(S): at 03:12

## 2018-11-23 RX ADMIN — Medication 50 GRAM(S): at 01:30

## 2018-11-23 RX ADMIN — TRAMADOL HYDROCHLORIDE 50 MILLIGRAM(S): 50 TABLET ORAL at 22:00

## 2018-11-23 RX ADMIN — Medication 4: at 07:54

## 2018-11-23 RX ADMIN — Medication 1 MILLIGRAM(S): at 12:33

## 2018-11-23 RX ADMIN — CLOPIDOGREL BISULFATE 75 MILLIGRAM(S): 75 TABLET, FILM COATED ORAL at 12:33

## 2018-11-23 RX ADMIN — Medication 7 UNIT(S): at 07:54

## 2018-11-23 RX ADMIN — Medication 650 MILLIGRAM(S): at 11:35

## 2018-11-23 RX ADMIN — OXYCODONE AND ACETAMINOPHEN 2 TABLET(S): 5; 325 TABLET ORAL at 12:36

## 2018-11-23 RX ADMIN — Medication 650 MILLIGRAM(S): at 10:23

## 2018-11-23 RX ADMIN — HEPARIN SODIUM 5000 UNIT(S): 5000 INJECTION INTRAVENOUS; SUBCUTANEOUS at 06:53

## 2018-11-23 RX ADMIN — OXYCODONE AND ACETAMINOPHEN 2 TABLET(S): 5; 325 TABLET ORAL at 13:51

## 2018-11-23 RX ADMIN — HEPARIN SODIUM 5000 UNIT(S): 5000 INJECTION INTRAVENOUS; SUBCUTANEOUS at 21:43

## 2018-11-23 RX ADMIN — ATORVASTATIN CALCIUM 20 MILLIGRAM(S): 80 TABLET, FILM COATED ORAL at 21:43

## 2018-11-23 NOTE — PROGRESS NOTE ADULT - SUBJECTIVE AND OBJECTIVE BOX
POD #2 s/p CABG X3 (LIMA to LAD, Rad sequential to OM1, Ramus  EF nl     PMH :  CAD  CKD (chronic kidney disease) stage 2, GFR 60-89 ml/min  DM (diabetes mellitus)  CVA (cerebral vascular accident)  HLD (hyperlipidemia)  HTN (hypertension)      ROS _ cough, incisional pain, abdominal distention   All other systems reviewed and negative.  Marital Status:  (x   )    (   ) Single    (   )    (  )   Lives with: (  ) alone  (  ) children   (  ) spouse   (  ) parents  (x  ) other  Recent Travel: No recent travel  Occupation:    Substance Use (street drugs): ( x ) never used  (  ) other:  Tobacco Usage:  ( x  ) never smoked   (   ) former smoker   (   ) current smoker  (     ) pack year  Alcohol Usage: None     FAMILY HISTORY:  No pertinent family history in first degree relatives    ICU Vital Signs Last 24 Hrs  T(C): 36.6 (11-23-18 @ 17:01), Max: 37.4 (11-22-18 @ 21:00)  T(F): 97.8 (11-23-18 @ 17:01), Max: 99.4 (11-22-18 @ 21:00)  HR: 72 (11-23-18 @ 18:00) (70 - 94)  BP: 118/70 (11-23-18 @ 18:00) (103/68 - 149/64)  BP(mean): 90 (11-23-18 @ 18:00) (79 - 106)  RR: 19 (11-23-18 @ 18:00) (19 - 30)  SpO2: 97% (11-23-18 @ 18:00) (92% - 100%)    I&O's Summary    22 Nov 2018 07:01  -  23 Nov 2018 07:00  --------------------------------------------------------  IN: 2063 mL / OUT: 2530 mL / NET: -467 mL    23 Nov 2018 07:01  -  23 Nov 2018 18:17  --------------------------------------------------------  IN: 430 mL / OUT: 630 mL / NET: -200 mL        ABG - ( 22 Nov 2018 09:39 )  pH: 7.44  /  pCO2: 36    /  pO2: 97    / HCO3: 24    / Base Excess: -0.4  /  SaO2: 98                            9.8    12.5  )-----------( 206      ( 23 Nov 2018 03:40 )             29.7     23 Nov 2018 03:40    135    |  99     |  13     ----------------------------<  168    4.6     |  26     |  1.16     Ca    8.4        23 Nov 2018 03:40  Phos  2.7       23 Nov 2018 03:40  Mg     3.2       23 Nov 2018 03:40    TPro  6.1    /  Alb  3.8    /  TBili  0.5    /  DBili  x      /  AST  22     /  ALT  9      /  AlkPhos  40     23 Nov 2018 03:40    PT/INR - ( 23 Nov 2018 03:40 )   PT: 14.9 sec;  INR: 1.31     PTT - ( 23 Nov 2018 03:40 )  PTT:37.2 sec    MEDICATIONS  (STANDING):  aspirin  chewable 81 milliGRAM(s) Oral daily  atorvastatin 20 milliGRAM(s) Oral at bedtime  clopidogrel Tablet 75 milliGRAM(s) Oral daily  docusate sodium 100 milliGRAM(s) Oral three times a day  folic acid 1 milliGRAM(s) Oral daily  heparin  Injectable 5000 Unit(s) SubCutaneous every 8 hours  influenza   Vaccine 0.5 milliLiter(s) IntraMuscular once  insulin glargine Injectable (LANTUS) 24 Unit(s) SubCutaneous at bedtime  insulin lispro (HumaLOG) corrective regimen sliding scale   SubCutaneous Before meals and at bedtime  insulin lispro Injectable (HumaLOG) 7 Unit(s) SubCutaneous three times a day before meals  metoprolol tartrate 12.5 milliGRAM(s) Oral every 8 hours  multivitamin 1 Tablet(s) Oral daily  ondansetron Injectable 4 milliGRAM(s) IV Push once  pantoprazole    Tablet 40 milliGRAM(s) Oral before breakfast  senna 2 Tablet(s) Oral at bedtime      Home Medications:  Ecotrin Adult Low Strength 81 mg oral delayed release tablet: 1 tab(s) orally once a day (21 Nov 2018 09:34)  glimepiride 4 mg oral tablet: 1 tab(s) orally 2 times a day (21 Nov 2018 09:34)  Januvia 100 mg oral tablet: 1 tab(s) orally once a day (21 Nov 2018 09:34)  Lipitor 20 mg oral tablet: 1 tab(s) orally once a day (at bedtime) (21 Nov 2018 09:34)  losartan-hydrochlorothiazide 100mg-12.5mg oral tablet: 1 tab(s) orally once a day (21 Nov 2018 09:34)  metFORMIN 500 mg oral tablet: 1 tab(s) orally 2 times a day (with meals) (21 Nov 2018 09:34)  metoprolol succinate 25 mg oral tablet, extended release: 1 tab(s) orally once a day (21 Nov 2018 09:34)  Multiple Vitamins with Folic Acid and Iron oral tablet: 1 tab(s) orally once a day (21 Nov 2018 09:34)    PHYSICAL EXAM:  Gen : no acute distress  Neck: No LAD, No JVD  Respiratory: decreased in the bases  Cardiovascular: S1 and S2, RRR, no M/G/R  Gastrointestinal: BS+, soft, NT/+ distention   Extremities: No peripheral edema  Vascular: 2+ peripheral pulses  Neurological: A/O x 3, no focal deficits  Skin : no rash or lesions   MSK : no weakness, normal gait   Incision: clean dry/ no sign of infection  Lines: no sign of infection

## 2018-11-23 NOTE — DISCHARGE NOTE ADULT - NS AS ACTIVITY OBS
Walking-Outdoors allowed/Do not make important decisions/Showering allowed/Walking-Indoors allowed/No Heavy lifting/straining/Stairs allowed/Do not drive or operate machinery

## 2018-11-23 NOTE — DISCHARGE NOTE ADULT - CARE PLAN
Principal Discharge DX:	CAD (coronary artery disease)  Goal:	To recover from surgery  Assessment and plan of treatment:	-Please follow up with Dr. Gallo on Tuesday 12/4/18 at 2:15pm.  The office is located at [facility], [address] [place] [address], [facility], 4th floor. Call us with any questions #[phone].    -Walk daily as tolerated and use your incentive spirometer every hour.    -No driving or strenuous activity/exercise for 6 weeks, or until cleared by your surgeon.    -Gently clean your incisions (chest and left wrist) with anti-bacterial soap and water, pat dry.  You may leave them open to air.    -Call your doctor if you have shortness of breath, chest pain not relieved by pain medication, dizziness, fever >101.5, or increased redness or drainage from incisions.  Secondary Diagnosis:	DM (diabetes mellitus)  Goal:	To maintain normal blood sugar levels.  Assessment and plan of treatment:	Continue to check your blood sugar levels before meals and at bedtime.  This is VERY important for proper wound healing.  Notify your doctor if your sugar levels are consistently <80 or >180.  You will be discharged home on insulin injections, possibly in the short-term.  If you have any questions or issues giving yourself insulin please reach out to your doctor.  Please follow up at the endocrine clinic on Thursday 12/6/18 at 11:30am.  The address is [facility] [address] [place] [address].  Phone number [phone].  Call with any questions.  Secondary Diagnosis:	HTN (hypertension)  Goal:	To maintain normal blood pressure and cholesterol levels.  Assessment and plan of treatment:	Please follow up with Dr. Sunny Jerry on Monday 12/10/18 @ 11:00am.  Office location is [address] [place] [address].  Phone number for reference #[phone].

## 2018-11-23 NOTE — DISCHARGE NOTE ADULT - PLAN OF CARE
To recover from surgery -Please follow up with Dr. Gallo on Tuesday 12/4/18 at 2:15pm.  The office is located at [facility], [address] [place] [address], [facility], 4th floor. Call us with any questions #[phone].    -Walk daily as tolerated and use your incentive spirometer every hour.    -No driving or strenuous activity/exercise for 6 weeks, or until cleared by your surgeon.    -Gently clean your incisions (chest and left wrist) with anti-bacterial soap and water, pat dry.  You may leave them open to air.    -Call your doctor if you have shortness of breath, chest pain not relieved by pain medication, dizziness, fever >101.5, or increased redness or drainage from incisions. To maintain normal blood sugar levels. Continue to check your blood sugar levels before meals and at bedtime.  This is VERY important for proper wound healing.  Notify your doctor if your sugar levels are consistently <80 or >180.  You will be discharged home on insulin injections, possibly in the short-term.  If you have any questions or issues giving yourself insulin please reach out to your doctor.  Please follow up at the endocrine clinic on Thursday 12/6/18 at 11:30am.  The address is [address] [place] [address].  Phone number [phone].  Call with any questions. To maintain normal blood pressure and cholesterol levels. Please follow up with Dr. Sunny Jerry on Monday 12/10/18 @ 11:00am.  Office location is [address] [place] [address].  Phone number for reference #[phone].

## 2018-11-23 NOTE — DISCHARGE NOTE ADULT - MEDICATION SUMMARY - MEDICATIONS TO TAKE
I will START or STAY ON the medications listed below when I get home from the hospital:    Ecotrin Adult Low Strength 81 mg oral delayed release tablet  -- 1 tab(s) by mouth once a day  -- Indication: For Heart disease    acetaminophen 325 mg oral tablet  -- 2 tab(s) by mouth every 6 hours, As needed, Mild Pain (1 - 3)  -- Indication: For mild pain    traMADol 50 mg oral tablet  -- 1 tab(s) by mouth every 8 hours, As needed, Moderate Pain (4 - 6) MDD:3 tabs  -- Indication: For moderate to severe pain    insulin lispro (concentrated) 200 units/mL subcutaneous solution  -- 7 unit(s) subcutaneous 3 times a day before meals  -- Indication: For Diabetes    insulin glargine 100 units/mL subcutaneous solution  -- 25 unit(s) subcutaneous once a day at bedtime  -- Do not drink alcoholic beverages when taking this medication.  It is very important that you take or use this exactly as directed.  Do not skip doses or discontinue unless directed by your doctor.  Keep in refrigerator.  Do not freeze.    -- Indication: For Diabetes    Lipitor 20 mg oral tablet  -- 1 tab(s) by mouth once a day (at bedtime)  -- Indication: For Cholesterol    clopidogrel 75 mg oral tablet  -- 1 tab(s) by mouth once a day  -- Indication: For Heart disease    metoprolol tartrate 25 mg oral tablet  -- 0.5 tab(s) by mouth every 12 hours   -- It is very important that you take or use this exactly as directed.  Do not skip doses or discontinue unless directed by your doctor.  May cause drowsiness.  Alcohol may intensify this effect.  Use care when operating dangerous machinery.  Some non-prescription drugs may aggravate your condition.  Read all labels carefully.  If a warning appears, check with your doctor before taking.  Take with food or milk.  This drug may impair the ability to drive or operate machinery.  Use care until you become familiar with its effects.    -- Indication: For blood pressure and heart rate control    amLODIPine 2.5 mg oral tablet  -- 1 tab(s) by mouth once a day  -- Indication: For blood pressure control, and to prevent vasospasm of arterial grafts    Multiple Vitamins with Folic Acid and Iron oral tablet  -- 1 tab(s) by mouth once a day  -- Indication: For supplement    docusate sodium 100 mg oral capsule  -- 1 cap(s) by mouth 3 times a day, As Needed -for constipation   -- Indication: For stool softener    pantoprazole 40 mg oral delayed release tablet  -- 1 tab(s) by mouth once a day (before a meal)  -- Indication: For stomach ulcer prevention    Multiple Vitamins oral tablet  -- 1 tab(s) by mouth once a day  -- Indication: For vitamin    folic acid 1 mg oral tablet  -- 1 tab(s) by mouth once a day  -- Indication: For supplement

## 2018-11-23 NOTE — DISCHARGE NOTE ADULT - CARE PROVIDER_API CALL
Rosalio Gallo (MD), Cardiovascular Surgery  [address]  4th [address]  [place], [place] [address]  Phone: [phone]  Fax: [phone]

## 2018-11-23 NOTE — DISCHARGE NOTE ADULT - PATIENT PORTAL LINK FT
You can access the [facility] Patient Portal, offered by [facility], by registering with the following website: [email or web address]

## 2018-11-23 NOTE — DISCHARGE NOTE ADULT - HOSPITAL COURSE
This is a 67 y/o male with PMHx HTN, HLD, CKD stage II, poorly controlled DM (10% A1C), and previous CVA w/o residual deficit.  He presented with NOVA and on 11/21/18 underwent an OPCAB x 3, uneventful.  POD#1 extubated, POD#2-5 remained in ICU for encouragement for pulmonary toilet and mobilization, remained stable.  POD#5 transferred to [facility], having frequent PACs.  Titrated up beta blocker.  Endocrine consulted and patient remained on insulin while in-house.  Small B/L pleural effusions on CXR, no lasix needed per Dr. Gallo.  Weaned to room air, ambulating, eating and using IS. Stable for D/C home today per Dr. Gallo.

## 2018-11-23 NOTE — PROGRESS NOTE ADULT - ASSESSMENT
67yo with history of CAD, HTN, DM poorly controlled, prior history of strokes with no residual recent cardiac eval for NOVA/fatigue diagnosed with CAD.  Now s/p CABG X3.      Problems   1. s/p CABG  2. Post op pain     Plan   Neuro -- pain control  CVS - ASA, Plavix, Statin  tolerating Lopressor 12.5 BID   chest tube management  Pulm - needs aggressive pulm toileting, IS, ambulation   GI - GI proph   - monitor UOP, diuresis   H/o CKD Cr has remained stable   Endo - glycemic control, H/o poorly controlled DM, Hgn A1c 10 on admission   Heme - DVT proph       Critical post op.    Critical care time spent 30 min

## 2018-11-24 LAB
ALBUMIN SERPL ELPH-MCNC: 3.7 G/DL — SIGNIFICANT CHANGE UP (ref 3.3–5)
ALP SERPL-CCNC: 47 U/L — SIGNIFICANT CHANGE UP (ref 40–120)
ALT FLD-CCNC: 7 U/L — LOW (ref 10–45)
ANION GAP SERPL CALC-SCNC: 11 MMOL/L — SIGNIFICANT CHANGE UP (ref 5–17)
APTT BLD: 36.1 SEC — SIGNIFICANT CHANGE UP (ref 27.5–36.3)
AST SERPL-CCNC: 17 U/L — SIGNIFICANT CHANGE UP (ref 10–40)
BILIRUB SERPL-MCNC: 0.5 MG/DL — SIGNIFICANT CHANGE UP (ref 0.2–1.2)
BUN SERPL-MCNC: 17 MG/DL — SIGNIFICANT CHANGE UP (ref 7–23)
CALCIUM SERPL-MCNC: 8.5 MG/DL — SIGNIFICANT CHANGE UP (ref 8.4–10.5)
CHLORIDE SERPL-SCNC: 96 MMOL/L — SIGNIFICANT CHANGE UP (ref 96–108)
CO2 SERPL-SCNC: 28 MMOL/L — SIGNIFICANT CHANGE UP (ref 22–31)
CREAT SERPL-MCNC: 1.23 MG/DL — SIGNIFICANT CHANGE UP (ref 0.5–1.3)
GLUCOSE BLDC GLUCOMTR-MCNC: 104 MG/DL — HIGH (ref 70–99)
GLUCOSE BLDC GLUCOMTR-MCNC: 152 MG/DL — HIGH (ref 70–99)
GLUCOSE BLDC GLUCOMTR-MCNC: 181 MG/DL — HIGH (ref 70–99)
GLUCOSE BLDC GLUCOMTR-MCNC: 91 MG/DL — SIGNIFICANT CHANGE UP (ref 70–99)
GLUCOSE SERPL-MCNC: 132 MG/DL — HIGH (ref 70–99)
HCT VFR BLD CALC: 29.1 % — LOW (ref 39–50)
HGB BLD-MCNC: 9.6 G/DL — LOW (ref 13–17)
INR BLD: 1.16 — SIGNIFICANT CHANGE UP (ref 0.88–1.16)
MAGNESIUM SERPL-MCNC: 2.3 MG/DL — SIGNIFICANT CHANGE UP (ref 1.6–2.6)
MCHC RBC-ENTMCNC: 29.7 PG — SIGNIFICANT CHANGE UP (ref 27–34)
MCHC RBC-ENTMCNC: 33 G/DL — SIGNIFICANT CHANGE UP (ref 32–36)
MCV RBC AUTO: 90.1 FL — SIGNIFICANT CHANGE UP (ref 80–100)
PHOSPHATE SERPL-MCNC: 3.1 MG/DL — SIGNIFICANT CHANGE UP (ref 2.5–4.5)
PLATELET # BLD AUTO: 183 K/UL — SIGNIFICANT CHANGE UP (ref 150–400)
POTASSIUM SERPL-MCNC: 4.2 MMOL/L — SIGNIFICANT CHANGE UP (ref 3.5–5.3)
POTASSIUM SERPL-SCNC: 4.2 MMOL/L — SIGNIFICANT CHANGE UP (ref 3.5–5.3)
PROT SERPL-MCNC: 6.4 G/DL — SIGNIFICANT CHANGE UP (ref 6–8.3)
PROTHROM AB SERPL-ACNC: 13.2 SEC — HIGH (ref 10–12.9)
RBC # BLD: 3.23 M/UL — LOW (ref 4.2–5.8)
RBC # FLD: 12.4 % — SIGNIFICANT CHANGE UP (ref 10.3–16.9)
SODIUM SERPL-SCNC: 135 MMOL/L — SIGNIFICANT CHANGE UP (ref 135–145)
WBC # BLD: 10.3 K/UL — SIGNIFICANT CHANGE UP (ref 3.8–10.5)
WBC # FLD AUTO: 10.3 K/UL — SIGNIFICANT CHANGE UP (ref 3.8–10.5)

## 2018-11-24 PROCEDURE — 71045 X-RAY EXAM CHEST 1 VIEW: CPT | Mod: 26,59

## 2018-11-24 PROCEDURE — 99291 CRITICAL CARE FIRST HOUR: CPT

## 2018-11-24 RX ORDER — FUROSEMIDE 40 MG
20 TABLET ORAL ONCE
Qty: 0 | Refills: 0 | Status: COMPLETED | OUTPATIENT
Start: 2018-11-24 | End: 2018-11-24

## 2018-11-24 RX ORDER — METOCLOPRAMIDE HCL 10 MG
10 TABLET ORAL ONCE
Qty: 0 | Refills: 0 | Status: COMPLETED | OUTPATIENT
Start: 2018-11-24 | End: 2018-11-24

## 2018-11-24 RX ORDER — ONDANSETRON 8 MG/1
4 TABLET, FILM COATED ORAL ONCE
Qty: 0 | Refills: 0 | Status: COMPLETED | OUTPATIENT
Start: 2018-11-24 | End: 2018-11-24

## 2018-11-24 RX ADMIN — Medication 12.5 MILLIGRAM(S): at 07:11

## 2018-11-24 RX ADMIN — Medication 7 UNIT(S): at 11:26

## 2018-11-24 RX ADMIN — Medication 100 MILLIGRAM(S): at 15:15

## 2018-11-24 RX ADMIN — ONDANSETRON 4 MILLIGRAM(S): 8 TABLET, FILM COATED ORAL at 09:28

## 2018-11-24 RX ADMIN — Medication 12.5 MILLIGRAM(S): at 16:59

## 2018-11-24 RX ADMIN — Medication 7 UNIT(S): at 16:58

## 2018-11-24 RX ADMIN — Medication 7 UNIT(S): at 07:18

## 2018-11-24 RX ADMIN — Medication 10 MILLIGRAM(S): at 09:28

## 2018-11-24 RX ADMIN — HEPARIN SODIUM 5000 UNIT(S): 5000 INJECTION INTRAVENOUS; SUBCUTANEOUS at 21:37

## 2018-11-24 RX ADMIN — Medication 12.5 MILLIGRAM(S): at 21:37

## 2018-11-24 RX ADMIN — ATORVASTATIN CALCIUM 20 MILLIGRAM(S): 80 TABLET, FILM COATED ORAL at 21:37

## 2018-11-24 RX ADMIN — Medication 2: at 11:26

## 2018-11-24 RX ADMIN — Medication 100 MILLIGRAM(S): at 07:13

## 2018-11-24 RX ADMIN — Medication 100 MILLIGRAM(S): at 21:37

## 2018-11-24 RX ADMIN — POLYETHYLENE GLYCOL 3350 17 GRAM(S): 17 POWDER, FOR SOLUTION ORAL at 07:13

## 2018-11-24 RX ADMIN — Medication 1 MILLIGRAM(S): at 11:14

## 2018-11-24 RX ADMIN — HEPARIN SODIUM 5000 UNIT(S): 5000 INJECTION INTRAVENOUS; SUBCUTANEOUS at 07:12

## 2018-11-24 RX ADMIN — Medication 81 MILLIGRAM(S): at 11:15

## 2018-11-24 RX ADMIN — PANTOPRAZOLE SODIUM 40 MILLIGRAM(S): 20 TABLET, DELAYED RELEASE ORAL at 07:11

## 2018-11-24 RX ADMIN — INSULIN GLARGINE 27 UNIT(S): 100 INJECTION, SOLUTION SUBCUTANEOUS at 21:43

## 2018-11-24 RX ADMIN — CLOPIDOGREL BISULFATE 75 MILLIGRAM(S): 75 TABLET, FILM COATED ORAL at 11:15

## 2018-11-24 RX ADMIN — HEPARIN SODIUM 5000 UNIT(S): 5000 INJECTION INTRAVENOUS; SUBCUTANEOUS at 15:15

## 2018-11-24 RX ADMIN — SENNA PLUS 2 TABLET(S): 8.6 TABLET ORAL at 21:37

## 2018-11-24 RX ADMIN — Medication 2: at 17:00

## 2018-11-24 RX ADMIN — Medication 1 TABLET(S): at 11:14

## 2018-11-24 RX ADMIN — Medication 20 MILLIGRAM(S): at 19:43

## 2018-11-24 NOTE — PHYSICAL THERAPY INITIAL EVALUATION ADULT - GENERAL OBSERVATIONS, REHAB EVAL
Rec'd pt seated in bedside chair with family present. NAD VSS /59 HR 74 spO2 98% 2L NC +EKG, +heplock, +SCD, +ext PPM

## 2018-11-24 NOTE — PHYSICAL THERAPY INITIAL EVALUATION ADULT - GAIT PATTERN USED, PT EVAL
Pt amb 40ft x 2 with RW and CG/Gilles with chair follow. Pt with multiple rests standing and one seated rest

## 2018-11-24 NOTE — PHYSICAL THERAPY INITIAL EVALUATION ADULT - PERTINENT HX OF CURRENT PROBLEM, REHAB EVAL
67 yo M, non-compliant with medical care presented to his cardiologist with c/o NOVA upon ambulation of <1 city block over past year. Pt also endorses intermittent generalized feeling of fatigue and 1 episode of PND 3 weeks ago that resolved after getting up and drinking glass of water.

## 2018-11-24 NOTE — PROGRESS NOTE ADULT - SUBJECTIVE AND OBJECTIVE BOX
INTERVAL HPI/OVERNIGHT EVENTS:    POD# 3 OPCAB x 3  EF normal     69yo mle with Hx HTN, Hyperlipidemia, CKD II, DM (HgA1c 10), CVA ('07/'17) without residual deficit with sxs NOVA    ECHO 9/18: EF 55%, Grade 1 Diastolic Dysfxn, mild left atrial enlargement, nodular calcification AV, no AS, no AR.   Stress Echo 10/2018: hypokinesis in the mid -basal anterolateral wall at peak stress, horizontal 1mm ST depression in the inferolateral leads, normal LV systolic function.     Cath 11/12: severe 2 vessel CAD.      To OR 11/21 - OPCAB x 3 - no infusions/blood products given  hypertensive post-op - cardene then transitioned to po meds  extubated 11/22 -   patient resistant to mobilization/incentive spirometry - requires alot of encouragement    Up and ambulating with staff this am - walked length of [place]    PMHx includes but is not limited to:   CAD   CKD II   DM   CVA   HLD   HTN     ICU Vital Signs Last 24 Hrs  T(C): 36.9 (24 Nov 2018 10:00), Max: 37.1 (23 Nov 2018 21:01)  T(F): 98.5 (24 Nov 2018 10:00), Max: 98.8 (23 Nov 2018 21:01)  HR: 70 (24 Nov 2018 13:00) (66 - 80) sinus  BP: 114/59 (24 Nov 2018 13:00) (107/65 - 142/71)  BP(mean): 80 (24 Nov 2018 13:00) (72 - 105)  SpO2: 98% (24 Nov 2018 13:00) (88% - 99%) on 1L NC    Qtts: None    I&O's Summary    23 Nov 2018 07:01  -  24 Nov 2018 07:00  --------------------------------------------------------  IN: 430 mL / OUT: 1030 mL / NET: -600 mL    24 Nov 2018 07:01  -  24 Nov 2018 13:23  --------------------------------------------------------  IN: 0 mL / OUT: 225 mL / NET: -225 mL    Physical Exam    Heart - regular (-)rub/gallop  Lungs - poor inspiratory effort - improved with prompting; dec BS bases - no rhonchi/wheeze  Abd - (+)BS soft NTND (-)r/r/g  Ext - warm to touch no cyanosis/clubbing  Chest - incisions site clean and dry  Neuro - alert/oriented and interactive; moving all extremities and non-focal  Skin - no rash    LABS:                        9.6    10.3  )-----------( 183      ( 24 Nov 2018 02:34 )             29.1     11-24    135  |  96  |  17  ----------------------------<  132<H>  4.2   |  28  |  1.23    Ca    8.5      24 Nov 2018 02:34  Phos  3.1     11-24  Mg     2.3     11-24    TPro  6.4  /  Alb  3.7  /  TBili  0.5  /  DBili  x   /  AST  17  /  ALT  7<L>  /  AlkPhos  47  11-24    PT/INR - ( 24 Nov 2018 02:34 )   PT: 13.2 sec;   INR: 1.16     PTT - ( 24 Nov 2018 02:34 )  PTT:36.1 sec    RADIOLOGY & ADDITIONAL STUDIES: reviewed    Patient with Hx non-adherence and poorly controlled DM presenting with NOVA - (+)CAD. Now POD #3    1. CV   hemodynamically stable  sinus rhythm  ASA/Plavix/statin  Metoprolol 12.5 mg q8h - titrate as clinical scenario allows    2. Pulm   needs alot of encouragement  titrate off supplemental oxygen  ambulation/incentive spirometry    3. Endocrine  poorly controlled DM - HgA1c 10.3  POC testing 152/104  lantus/premeal/ISS  maintain glycemic control  diabetic education and importance of compliance to be stressed    bowel regimen  DVT and GI prophylaxis    d/w patient and family members present/staff and CTS    I have spent/provided stated minutes of critical care time to this patient: 60 min

## 2018-11-25 LAB
ALBUMIN SERPL ELPH-MCNC: 3.5 G/DL — SIGNIFICANT CHANGE UP (ref 3.3–5)
ALP SERPL-CCNC: 54 U/L — SIGNIFICANT CHANGE UP (ref 40–120)
ALT FLD-CCNC: 7 U/L — LOW (ref 10–45)
ANION GAP SERPL CALC-SCNC: 13 MMOL/L — SIGNIFICANT CHANGE UP (ref 5–17)
APTT BLD: 34.8 SEC — SIGNIFICANT CHANGE UP (ref 27.5–36.3)
AST SERPL-CCNC: 14 U/L — SIGNIFICANT CHANGE UP (ref 10–40)
BILIRUB SERPL-MCNC: 0.5 MG/DL — SIGNIFICANT CHANGE UP (ref 0.2–1.2)
BUN SERPL-MCNC: 19 MG/DL — SIGNIFICANT CHANGE UP (ref 7–23)
CALCIUM SERPL-MCNC: 8.6 MG/DL — SIGNIFICANT CHANGE UP (ref 8.4–10.5)
CHLORIDE SERPL-SCNC: 96 MMOL/L — SIGNIFICANT CHANGE UP (ref 96–108)
CO2 SERPL-SCNC: 28 MMOL/L — SIGNIFICANT CHANGE UP (ref 22–31)
CREAT SERPL-MCNC: 1.18 MG/DL — SIGNIFICANT CHANGE UP (ref 0.5–1.3)
GLUCOSE BLDC GLUCOMTR-MCNC: 100 MG/DL — HIGH (ref 70–99)
GLUCOSE BLDC GLUCOMTR-MCNC: 109 MG/DL — HIGH (ref 70–99)
GLUCOSE BLDC GLUCOMTR-MCNC: 82 MG/DL — SIGNIFICANT CHANGE UP (ref 70–99)
GLUCOSE BLDC GLUCOMTR-MCNC: 97 MG/DL — SIGNIFICANT CHANGE UP (ref 70–99)
GLUCOSE SERPL-MCNC: 79 MG/DL — SIGNIFICANT CHANGE UP (ref 70–99)
HCT VFR BLD CALC: 29.6 % — LOW (ref 39–50)
HGB BLD-MCNC: 9.7 G/DL — LOW (ref 13–17)
INR BLD: 1.09 — SIGNIFICANT CHANGE UP (ref 0.88–1.16)
MAGNESIUM SERPL-MCNC: 2.2 MG/DL — SIGNIFICANT CHANGE UP (ref 1.6–2.6)
MCHC RBC-ENTMCNC: 29.4 PG — SIGNIFICANT CHANGE UP (ref 27–34)
MCHC RBC-ENTMCNC: 32.8 G/DL — SIGNIFICANT CHANGE UP (ref 32–36)
MCV RBC AUTO: 89.7 FL — SIGNIFICANT CHANGE UP (ref 80–100)
PHOSPHATE SERPL-MCNC: 3.2 MG/DL — SIGNIFICANT CHANGE UP (ref 2.5–4.5)
PLATELET # BLD AUTO: 226 K/UL — SIGNIFICANT CHANGE UP (ref 150–400)
POTASSIUM SERPL-MCNC: 3.5 MMOL/L — SIGNIFICANT CHANGE UP (ref 3.5–5.3)
POTASSIUM SERPL-SCNC: 3.5 MMOL/L — SIGNIFICANT CHANGE UP (ref 3.5–5.3)
PROT SERPL-MCNC: 6.4 G/DL — SIGNIFICANT CHANGE UP (ref 6–8.3)
PROTHROM AB SERPL-ACNC: 12.4 SEC — SIGNIFICANT CHANGE UP (ref 10–12.9)
RBC # BLD: 3.3 M/UL — LOW (ref 4.2–5.8)
RBC # FLD: 12.4 % — SIGNIFICANT CHANGE UP (ref 10.3–16.9)
SODIUM SERPL-SCNC: 137 MMOL/L — SIGNIFICANT CHANGE UP (ref 135–145)
WBC # BLD: 9.9 K/UL — SIGNIFICANT CHANGE UP (ref 3.8–10.5)
WBC # FLD AUTO: 9.9 K/UL — SIGNIFICANT CHANGE UP (ref 3.8–10.5)

## 2018-11-25 PROCEDURE — 71045 X-RAY EXAM CHEST 1 VIEW: CPT | Mod: 26,59

## 2018-11-25 PROCEDURE — 99291 CRITICAL CARE FIRST HOUR: CPT

## 2018-11-25 RX ORDER — POTASSIUM CHLORIDE 20 MEQ
40 PACKET (EA) ORAL ONCE
Qty: 0 | Refills: 0 | Status: COMPLETED | OUTPATIENT
Start: 2018-11-25 | End: 2018-11-25

## 2018-11-25 RX ORDER — FUROSEMIDE 40 MG
20 TABLET ORAL ONCE
Qty: 0 | Refills: 0 | Status: COMPLETED | OUTPATIENT
Start: 2018-11-25 | End: 2018-11-25

## 2018-11-25 RX ORDER — FUROSEMIDE 40 MG
40 TABLET ORAL DAILY
Qty: 0 | Refills: 0 | Status: DISCONTINUED | OUTPATIENT
Start: 2018-11-25 | End: 2018-11-26

## 2018-11-25 RX ORDER — POTASSIUM CHLORIDE 20 MEQ
20 PACKET (EA) ORAL ONCE
Qty: 0 | Refills: 0 | Status: COMPLETED | OUTPATIENT
Start: 2018-11-25 | End: 2018-11-25

## 2018-11-25 RX ADMIN — Medication 650 MILLIGRAM(S): at 10:17

## 2018-11-25 RX ADMIN — Medication 100 MILLIGRAM(S): at 07:22

## 2018-11-25 RX ADMIN — INSULIN GLARGINE 27 UNIT(S): 100 INJECTION, SOLUTION SUBCUTANEOUS at 21:31

## 2018-11-25 RX ADMIN — Medication 1 MILLIGRAM(S): at 12:40

## 2018-11-25 RX ADMIN — Medication 650 MILLIGRAM(S): at 23:33

## 2018-11-25 RX ADMIN — Medication 650 MILLIGRAM(S): at 10:47

## 2018-11-25 RX ADMIN — HEPARIN SODIUM 5000 UNIT(S): 5000 INJECTION INTRAVENOUS; SUBCUTANEOUS at 07:24

## 2018-11-25 RX ADMIN — Medication 100 MILLIGRAM(S): at 13:16

## 2018-11-25 RX ADMIN — Medication 12.5 MILLIGRAM(S): at 21:31

## 2018-11-25 RX ADMIN — Medication 12.5 MILLIGRAM(S): at 07:22

## 2018-11-25 RX ADMIN — Medication 40 MILLIEQUIVALENT(S): at 07:21

## 2018-11-25 RX ADMIN — Medication 12.5 MILLIGRAM(S): at 14:36

## 2018-11-25 RX ADMIN — PANTOPRAZOLE SODIUM 40 MILLIGRAM(S): 20 TABLET, DELAYED RELEASE ORAL at 07:22

## 2018-11-25 RX ADMIN — Medication 7 UNIT(S): at 12:15

## 2018-11-25 RX ADMIN — Medication 40 MILLIGRAM(S): at 07:22

## 2018-11-25 RX ADMIN — HEPARIN SODIUM 5000 UNIT(S): 5000 INJECTION INTRAVENOUS; SUBCUTANEOUS at 13:16

## 2018-11-25 RX ADMIN — HEPARIN SODIUM 5000 UNIT(S): 5000 INJECTION INTRAVENOUS; SUBCUTANEOUS at 21:31

## 2018-11-25 RX ADMIN — ATORVASTATIN CALCIUM 20 MILLIGRAM(S): 80 TABLET, FILM COATED ORAL at 21:31

## 2018-11-25 RX ADMIN — Medication 20 MILLIGRAM(S): at 19:12

## 2018-11-25 RX ADMIN — CLOPIDOGREL BISULFATE 75 MILLIGRAM(S): 75 TABLET, FILM COATED ORAL at 12:40

## 2018-11-25 RX ADMIN — Medication 81 MILLIGRAM(S): at 12:40

## 2018-11-25 RX ADMIN — Medication 1 TABLET(S): at 12:40

## 2018-11-25 RX ADMIN — Medication 7 UNIT(S): at 07:23

## 2018-11-25 RX ADMIN — Medication 7 UNIT(S): at 17:04

## 2018-11-25 RX ADMIN — Medication 40 MILLIEQUIVALENT(S): at 07:23

## 2018-11-25 NOTE — PROGRESS NOTE ADULT - SUBJECTIVE AND OBJECTIVE BOX
INTERVAL HPI/OVERNIGHT EVENTS:    POD#4 OPCAB x 3  EF normal     69yo mle with Hx HTN, Hyperlipidemia, CKD II, DM (HgA1c 10), CVA ('07/'17) without residual deficit with sxs NOVA    ECHO 9/18: EF 55%, Grade 1 Diastolic Dysfxn, mild left atrial enlargement, nodular calcification AV, no AS, no AR.   Stress Echo 10/2018: hypokinesis in the mid -basal anterolateral wall at peak stress, horizontal 1mm ST depression in the inferolateral leads, normal LV systolic function.     Cath 11/12: severe 2 vessel CAD.      To OR 11/21 - OPCAB x 3 - no infusions/blood products given  hypertensive post-op - cardene then transitioned to po meds  extubated 11/22 -   patient resistant to mobilization/incentive spirometry - requires alot of encouragement but was up and ambulating with staff several times yesterday and already up today - walked length of unit     PMHx includes but is not limited to:   CAD   CKD II   DM   CVA   HLD   HTN     ICU Vital Signs Last 24 Hrs  T(C): 36.3 (25 Nov 2018 10:10), Max: 37.2 (24 Nov 2018 17:01)  T(F): 97.4 (25 Nov 2018 10:10), Max: 99 (24 Nov 2018 17:01)  HR: 68 (25 Nov 2018 10:00) (64 - 78) sinus with APC  BP: 116/65 (25 Nov 2018 10:00) (104/68 - 142/72)  BP(mean): 85 (25 Nov 2018 10:00) (78 - 100)  SpO2: 93% (25 Nov 2018 10:00) (92% - 100%) RA    Qtts: None    I&O's Summary    24 Nov 2018 07:01  -  25 Nov 2018 07:00  --------------------------------------------------------  IN: 0 mL / OUT: 1225 mL / NET: -1225 mL    25 Nov 2018 07:01  -  25 Nov 2018 10:39  --------------------------------------------------------  IN: 0 mL / OUT: 250 mL / NET: -250 mL    Physical Exam    Heart - regular (-)rub/gallop  Lungs - CTA anterior (-)rhonchi/wheeze; dec BS posterior on ausculatation  Abd - (+) BS soft NTND (-)r/r/g  Ext - warm to touch no cyanosis/clubbing/edema  Chest - incision site clean and dry - no erythema/discharge  Neuro - alert/oriented and interactive; moving all extremities and non-focal  Skin - no rash    LABS:                        9.7    9.9   )-----------( 226      ( 25 Nov 2018 02:37 )             29.6     11-25    137  |  96  |  19  ----------------------------<  79  3.5   |  28  |  1.18    Ca    8.6      25 Nov 2018 02:36  Phos  3.2     11-25  Mg     2.2     11-25    TPro  6.4  /  Alb  3.5  /  TBili  0.5  /  DBili  x   /  AST  14  /  ALT  7<L>  /  AlkPhos  54  11-25    PT/INR - ( 25 Nov 2018 02:36 )   PT: 12.4 sec;   INR: 1.09     PTT - ( 25 Nov 2018 02:36 )  PTT:34.8 sec    RADIOLOGY & ADDITIONAL STUDIES: reviewed    Patient with Hx non-adherence and poorly controlled DM presenting with NOVA - (+)CAD. Now POD #4    1. CV   hemodynamically stable  sinus rhythm  ASA/Plavix/statin  Metoprolol 12.5 mg q8h - titrate as clinical scenario allows    2. Pulm   needs alot of encouragement  titrate off supplemental oxygen  ongoing ambulation/incentive spirometry    3. Endocrine  poorly controlled DM - HgA1c 10.3  POC testing 82  lantus/premeal/ISS  maintain glycemic control  diabetic education and importance of compliance to be stressed    bowel regimen  DVT and GI prophylaxis    d/w patient/staff and CTS    I have spent/provided stated minutes of critical care time to this patient: 60 min

## 2018-11-26 LAB
ANION GAP SERPL CALC-SCNC: 15 MMOL/L — SIGNIFICANT CHANGE UP (ref 5–17)
BUN SERPL-MCNC: 25 MG/DL — HIGH (ref 7–23)
CALCIUM SERPL-MCNC: 8.7 MG/DL — SIGNIFICANT CHANGE UP (ref 8.4–10.5)
CHLORIDE SERPL-SCNC: 98 MMOL/L — SIGNIFICANT CHANGE UP (ref 96–108)
CO2 SERPL-SCNC: 26 MMOL/L — SIGNIFICANT CHANGE UP (ref 22–31)
CREAT SERPL-MCNC: 1.29 MG/DL — SIGNIFICANT CHANGE UP (ref 0.5–1.3)
GLUCOSE BLDC GLUCOMTR-MCNC: 109 MG/DL — HIGH (ref 70–99)
GLUCOSE BLDC GLUCOMTR-MCNC: 120 MG/DL — HIGH (ref 70–99)
GLUCOSE BLDC GLUCOMTR-MCNC: 134 MG/DL — HIGH (ref 70–99)
GLUCOSE BLDC GLUCOMTR-MCNC: 143 MG/DL — HIGH (ref 70–99)
GLUCOSE SERPL-MCNC: 135 MG/DL — HIGH (ref 70–99)
HCT VFR BLD CALC: 29.6 % — LOW (ref 39–50)
HGB BLD-MCNC: 9.6 G/DL — LOW (ref 13–17)
MAGNESIUM SERPL-MCNC: 2.2 MG/DL — SIGNIFICANT CHANGE UP (ref 1.6–2.6)
MCHC RBC-ENTMCNC: 29.3 PG — SIGNIFICANT CHANGE UP (ref 27–34)
MCHC RBC-ENTMCNC: 32.4 G/DL — SIGNIFICANT CHANGE UP (ref 32–36)
MCV RBC AUTO: 90.2 FL — SIGNIFICANT CHANGE UP (ref 80–100)
PLATELET # BLD AUTO: 290 K/UL — SIGNIFICANT CHANGE UP (ref 150–400)
POTASSIUM SERPL-MCNC: 4.3 MMOL/L — SIGNIFICANT CHANGE UP (ref 3.5–5.3)
POTASSIUM SERPL-SCNC: 4.3 MMOL/L — SIGNIFICANT CHANGE UP (ref 3.5–5.3)
RBC # BLD: 3.28 M/UL — LOW (ref 4.2–5.8)
RBC # FLD: 12.5 % — SIGNIFICANT CHANGE UP (ref 10.3–16.9)
SODIUM SERPL-SCNC: 139 MMOL/L — SIGNIFICANT CHANGE UP (ref 135–145)
TSH SERPL-MCNC: 1.98 UIU/ML — SIGNIFICANT CHANGE UP (ref 0.35–4.94)
WBC # BLD: 7.5 K/UL — SIGNIFICANT CHANGE UP (ref 3.8–10.5)
WBC # FLD AUTO: 7.5 K/UL — SIGNIFICANT CHANGE UP (ref 3.8–10.5)

## 2018-11-26 PROCEDURE — 99222 1ST HOSP IP/OBS MODERATE 55: CPT | Mod: GC

## 2018-11-26 PROCEDURE — 71046 X-RAY EXAM CHEST 2 VIEWS: CPT | Mod: 26

## 2018-11-26 PROCEDURE — 93010 ELECTROCARDIOGRAM REPORT: CPT

## 2018-11-26 RX ORDER — INSULIN GLARGINE 100 [IU]/ML
24 INJECTION, SOLUTION SUBCUTANEOUS AT BEDTIME
Qty: 0 | Refills: 0 | Status: DISCONTINUED | OUTPATIENT
Start: 2018-11-26 | End: 2018-11-27

## 2018-11-26 RX ORDER — METOPROLOL TARTRATE 50 MG
12.5 TABLET ORAL EVERY 6 HOURS
Qty: 0 | Refills: 0 | Status: DISCONTINUED | OUTPATIENT
Start: 2018-11-26 | End: 2018-11-26

## 2018-11-26 RX ORDER — METOPROLOL TARTRATE 50 MG
12.5 TABLET ORAL EVERY 12 HOURS
Qty: 0 | Refills: 0 | Status: DISCONTINUED | OUTPATIENT
Start: 2018-11-26 | End: 2018-11-27

## 2018-11-26 RX ORDER — POTASSIUM CHLORIDE 20 MEQ
10 PACKET (EA) ORAL DAILY
Qty: 0 | Refills: 0 | Status: DISCONTINUED | OUTPATIENT
Start: 2018-11-26 | End: 2018-11-27

## 2018-11-26 RX ORDER — FUROSEMIDE 40 MG
20 TABLET ORAL DAILY
Qty: 0 | Refills: 0 | Status: DISCONTINUED | OUTPATIENT
Start: 2018-11-27 | End: 2018-11-27

## 2018-11-26 RX ORDER — METOPROLOL TARTRATE 50 MG
12.5 TABLET ORAL
Qty: 0 | Refills: 0 | Status: DISCONTINUED | OUTPATIENT
Start: 2018-11-26 | End: 2018-11-26

## 2018-11-26 RX ORDER — FUROSEMIDE 40 MG
10 TABLET ORAL ONCE
Qty: 0 | Refills: 0 | Status: COMPLETED | OUTPATIENT
Start: 2018-11-26 | End: 2018-11-26

## 2018-11-26 RX ORDER — SODIUM CHLORIDE 9 MG/ML
3 INJECTION INTRAMUSCULAR; INTRAVENOUS; SUBCUTANEOUS EVERY 8 HOURS
Qty: 0 | Refills: 0 | Status: DISCONTINUED | OUTPATIENT
Start: 2018-11-26 | End: 2018-11-27

## 2018-11-26 RX ADMIN — Medication 12.5 MILLIGRAM(S): at 10:23

## 2018-11-26 RX ADMIN — Medication 81 MILLIGRAM(S): at 11:02

## 2018-11-26 RX ADMIN — Medication 10 MILLIGRAM(S): at 12:36

## 2018-11-26 RX ADMIN — Medication 7 UNIT(S): at 12:35

## 2018-11-26 RX ADMIN — Medication 100 MILLIGRAM(S): at 14:12

## 2018-11-26 RX ADMIN — Medication 650 MILLIGRAM(S): at 09:03

## 2018-11-26 RX ADMIN — HEPARIN SODIUM 5000 UNIT(S): 5000 INJECTION INTRAVENOUS; SUBCUTANEOUS at 14:12

## 2018-11-26 RX ADMIN — Medication 1 TABLET(S): at 11:02

## 2018-11-26 RX ADMIN — SODIUM CHLORIDE 3 MILLILITER(S): 9 INJECTION INTRAMUSCULAR; INTRAVENOUS; SUBCUTANEOUS at 22:43

## 2018-11-26 RX ADMIN — CLOPIDOGREL BISULFATE 75 MILLIGRAM(S): 75 TABLET, FILM COATED ORAL at 11:02

## 2018-11-26 RX ADMIN — Medication 1 MILLIGRAM(S): at 11:02

## 2018-11-26 RX ADMIN — Medication 10 MILLIEQUIVALENT(S): at 14:12

## 2018-11-26 RX ADMIN — ATORVASTATIN CALCIUM 20 MILLIGRAM(S): 80 TABLET, FILM COATED ORAL at 22:43

## 2018-11-26 RX ADMIN — TRAMADOL HYDROCHLORIDE 50 MILLIGRAM(S): 50 TABLET ORAL at 05:30

## 2018-11-26 RX ADMIN — HEPARIN SODIUM 5000 UNIT(S): 5000 INJECTION INTRAVENOUS; SUBCUTANEOUS at 22:43

## 2018-11-26 RX ADMIN — INSULIN GLARGINE 24 UNIT(S): 100 INJECTION, SOLUTION SUBCUTANEOUS at 22:42

## 2018-11-26 RX ADMIN — Medication 40 MILLIGRAM(S): at 06:34

## 2018-11-26 RX ADMIN — PANTOPRAZOLE SODIUM 40 MILLIGRAM(S): 20 TABLET, DELAYED RELEASE ORAL at 06:34

## 2018-11-26 RX ADMIN — HEPARIN SODIUM 5000 UNIT(S): 5000 INJECTION INTRAVENOUS; SUBCUTANEOUS at 06:33

## 2018-11-26 RX ADMIN — Medication 650 MILLIGRAM(S): at 00:30

## 2018-11-26 RX ADMIN — SENNA PLUS 2 TABLET(S): 8.6 TABLET ORAL at 22:43

## 2018-11-26 RX ADMIN — Medication 100 MILLIGRAM(S): at 22:43

## 2018-11-26 RX ADMIN — TRAMADOL HYDROCHLORIDE 50 MILLIGRAM(S): 50 TABLET ORAL at 04:41

## 2018-11-26 RX ADMIN — SODIUM CHLORIDE 3 MILLILITER(S): 9 INJECTION INTRAMUSCULAR; INTRAVENOUS; SUBCUTANEOUS at 14:10

## 2018-11-26 RX ADMIN — Medication 7 UNIT(S): at 16:47

## 2018-11-26 RX ADMIN — Medication 7 UNIT(S): at 06:34

## 2018-11-26 RX ADMIN — Medication 12.5 MILLIGRAM(S): at 18:26

## 2018-11-26 RX ADMIN — Medication 650 MILLIGRAM(S): at 08:24

## 2018-11-26 NOTE — PROGRESS NOTE ADULT - SUBJECTIVE AND OBJECTIVE BOX
Patient discussed on morning rounds with Dr. Gallo     Operation / Date: 11/21/18 CABGx3 EF 55%    SUBJECTIVE ASSESSMENT:  68y Male seen and examined at the bedside, transferred to  from . No acute events overnight.    Vital Signs Last 24 Hrs  T(C): 36.8 (26 Nov 2018 10:18), Max: 36.9 (25 Nov 2018 21:01)  T(F): 98.2 (26 Nov 2018 10:18), Max: 98.4 (25 Nov 2018 21:01)  HR: 74 (26 Nov 2018 09:00) (66 - 78)  BP: 145/76 (26 Nov 2018 09:00) (99/53 - 145/76)  BP(mean): 100 (26 Nov 2018 09:00) (66 - 111)  RR: 24 (26 Nov 2018 09:00) (12 - 24)  SpO2: 97% (26 Nov 2018 09:00) (91% - 98%)  I&O's Detail    25 Nov 2018 07:01  -  26 Nov 2018 07:00  --------------------------------------------------------  IN:  Total IN: 0 mL    OUT:    Voided: 1250 mL  Total OUT: 1250 mL    Total NET: -1250 mL      26 Nov 2018 07:01  -  26 Nov 2018 11:55  --------------------------------------------------------  IN:    Oral Fluid: 400 mL  Total IN: 400 mL    OUT:    Voided: 300 mL  Total OUT: 300 mL    Total NET: 100 mL    CHEST TUBE:  No.    CIARA DRAIN:  No.  EPICARDIAL WIRES: Yes  TIE DOWNS: Yes  HAMILTON: No.    PHYSICAL EXAM:    General:   Neurological:  Cardiovascular:  Respiratory:  Gastrointestinal:  Extremities:  Vascular:  Incision Sites:    LABS:                        9.6    7.5   )-----------( 290      ( 26 Nov 2018 05:57 )             29.6       COUMADIN:  No    PT/INR - ( 25 Nov 2018 02:36 )   PT: 12.4 sec;   INR: 1.09      PTT - ( 25 Nov 2018 02:36 )  PTT:34.8 sec    11-26    139  |  98  |  25<H>  ----------------------------<  135<H>  4.3   |  26  |  1.29    Ca    8.7      26 Nov 2018 05:57  Phos  3.2     11-25  Mg     2.2     11-26    TPro  6.4  /  Alb  3.5  /  TBili  0.5  /  DBili  x   /  AST  14  /  ALT  7<L>  /  AlkPhos  54  11-25      MEDICATIONS  (STANDING):  aspirin  chewable 81 milliGRAM(s) Oral daily  atorvastatin 20 milliGRAM(s) Oral at bedtime  clopidogrel Tablet 75 milliGRAM(s) Oral daily  dextrose 5%. 1000 milliLiter(s) (50 mL/Hr) IV Continuous <Continuous>  dextrose 50% Injectable 50 milliLiter(s) IV Push every 15 minutes  dextrose 50% Injectable 25 milliLiter(s) IV Push every 15 minutes  docusate sodium 100 milliGRAM(s) Oral three times a day  folic acid 1 milliGRAM(s) Oral daily  heparin  Injectable 5000 Unit(s) SubCutaneous every 8 hours  influenza   Vaccine 0.5 milliLiter(s) IntraMuscular once  insulin glargine Injectable (LANTUS) 27 Unit(s) SubCutaneous at bedtime  insulin lispro (HumaLOG) corrective regimen sliding scale   SubCutaneous Before meals and at bedtime  insulin lispro Injectable (HumaLOG) 7 Unit(s) SubCutaneous three times a day before meals  metoprolol tartrate 12.5 milliGRAM(s) Oral <User Schedule>  multivitamin 1 Tablet(s) Oral daily  pantoprazole    Tablet 40 milliGRAM(s) Oral before breakfast  senna 2 Tablet(s) Oral at bedtime  sodium chloride 0.9% lock flush 3 milliLiter(s) IV Push every 8 hours  sodium chloride 0.9%. 1000 milliLiter(s) (10 mL/Hr) IV Continuous <Continuous>    MEDICATIONS  (PRN):  acetaminophen   Tablet .. 650 milliGRAM(s) Oral every 6 hours PRN Mild Pain (1 - 3)  bisacodyl Suppository 10 milliGRAM(s) Rectal daily PRN Constipation  dextrose 40% Gel 15 Gram(s) Oral once PRN Blood Glucose LESS THAN 70 milliGRAM(s)/deciliter  glucagon  Injectable 1 milliGRAM(s) IntraMuscular once PRN Glucose LESS THAN 70 milligrams/deciliter  ipratropium    for Nebulization 500 MICROGram(s) Nebulizer every 6 hours PRN Shortness of Breath and/or Wheezing  polyethylene glycol 3350 17 Gram(s) Oral daily PRN Constipation  traMADol 50 milliGRAM(s) Oral every 8 hours PRN Moderate Pain (4 - 6)      RADIOLOGY & ADDITIONAL TESTS:  CXR: < from: Xray Chest 1 View- PORTABLE-Routine (11.25.18 @ 03:29) >  Portable exam at chest demonstrates cardiomegaly. Patient status post   sternotomy. Bilateral effusions congestive change cannot be excluded.   Status post sternotomy.  Impression: Bilateral effusions. Congestive change cannot be excluded      A/P: 69 y/o M w/ PMH of HTN, HLD, CKD, DM uncontrolled, CVA now POD#5 from uncomplicated CABGx3 (LIMA-LAD, Sequential radial to OM and Ramus) EF 55%. PT doing well on 9L.     Neurovascular: No delirium. Pain well controlled with current regimen.  -PRN ultram and tylenol     Cardiovascular: Hemodynamically stable. HR controlled. POD#5, from OPCAB  -continue to monitor BP/HR/Tele   -increased lopressor to 12.5mg PO q6Hr, continue ASA/Plavix and lipitor    Lasis 10mg IV x1 today, Lasix 20mg PO tomorrow x3 days     Respiratory: 02 Sat = 98% on 2LNC.  -If on oxygen wean to RA from for O2 Sat > 93%.  -Lasix 10mg IV today  -Encourage C+DB and Use of IS 10x / hr while awake.  -CXR- PA/Lat tomorrow     GI: Stable.  -PPX- protonix  -PO Diet.    Renal / :  25/1.29 continue to trend daily labs   -Lasix x5 days ordered   -Monitor renal function.  -Monitor I/O's.    Endocrine:  h/o uncontrolled DM, endocrine team consulted and following  -A1c- 10   -continue RISS, Lantus 27u QHS, Humalog 7U TID  -TSH- f/u add on labs     Hematologic: H/H 9.6/29.6  -continue to trend daily labs .    ID: no acute issues   -Temp- aferbile  -CBC- WBC 7.5  -Observe for SIRS/Sepsis Syndrome.    Prophylaxis:  -DVT prophylaxis with 5000 SubQ Heparin q8h.  -SCD's    Disposition:  -transferred to  from , d/c home tomorrow Patient discussed on morning rounds with Dr. Gallo     Operation / Date: 11/21/18 CABGx3 EF 55%    SUBJECTIVE ASSESSMENT:  68y Male seen and examined at the bedside, transferred to  from . No acute events overnight. denies SOB or CP    Vital Signs Last 24 Hrs  T(C): 36.8 (26 Nov 2018 10:18), Max: 36.9 (25 Nov 2018 21:01)  T(F): 98.2 (26 Nov 2018 10:18), Max: 98.4 (25 Nov 2018 21:01)  HR: 74 (26 Nov 2018 09:00) (66 - 78)  BP: 145/76 (26 Nov 2018 09:00) (99/53 - 145/76)  BP(mean): 100 (26 Nov 2018 09:00) (66 - 111)  RR: 24 (26 Nov 2018 09:00) (12 - 24)  SpO2: 97% (26 Nov 2018 09:00) (91% - 98%)  I&O's Detail    25 Nov 2018 07:01  -  26 Nov 2018 07:00  --------------------------------------------------------  IN:  Total IN: 0 mL    OUT:    Voided: 1250 mL  Total OUT: 1250 mL    Total NET: -1250 mL      26 Nov 2018 07:01  -  26 Nov 2018 11:55  --------------------------------------------------------  IN:    Oral Fluid: 400 mL  Total IN: 400 mL    OUT:    Voided: 300 mL  Total OUT: 300 mL    Total NET: 100 mL    CHEST TUBE:  No.    CIARA DRAIN:  No.  EPICARDIAL WIRES: Yes  TIE DOWNS: Yes  HAMILTON: No.    PHYSICAL EXAM:    General: NAD, sitting upright in bed   Neurological: moving all extremities, lacking motivation   Cardiovascular: Irregular rate (PACs on Tele) no m/r/g  Respiratory: Poor effort during exam, decreased breath sounds at the bases b/l   Gastrointestinal: +BS, NT- ND, soft   Extremities: warm and well perfused, no edema or calf tenderness b/l   Vascular: +DP b/l, +left radial artery harvest   Incision Sites: Sternotomy CDI, no erythema, ecchymosis or hematoma. +left radial incision CDI, slight TTP, no erythema or drainage, mild localized ecchymosis     LABS:                        9.6    7.5   )-----------( 290      ( 26 Nov 2018 05:57 )             29.6       COUMADIN:  No    PT/INR - ( 25 Nov 2018 02:36 )   PT: 12.4 sec;   INR: 1.09      PTT - ( 25 Nov 2018 02:36 )  PTT:34.8 sec    11-26    139  |  98  |  25<H>  ----------------------------<  135<H>  4.3   |  26  |  1.29    Ca    8.7      26 Nov 2018 05:57  Phos  3.2     11-25  Mg     2.2     11-26    TPro  6.4  /  Alb  3.5  /  TBili  0.5  /  DBili  x   /  AST  14  /  ALT  7<L>  /  AlkPhos  54  11-25      MEDICATIONS  (STANDING):  aspirin  chewable 81 milliGRAM(s) Oral daily  atorvastatin 20 milliGRAM(s) Oral at bedtime  clopidogrel Tablet 75 milliGRAM(s) Oral daily  dextrose 5%. 1000 milliLiter(s) (50 mL/Hr) IV Continuous <Continuous>  dextrose 50% Injectable 50 milliLiter(s) IV Push every 15 minutes  dextrose 50% Injectable 25 milliLiter(s) IV Push every 15 minutes  docusate sodium 100 milliGRAM(s) Oral three times a day  folic acid 1 milliGRAM(s) Oral daily  heparin  Injectable 5000 Unit(s) SubCutaneous every 8 hours  influenza   Vaccine 0.5 milliLiter(s) IntraMuscular once  insulin glargine Injectable (LANTUS) 27 Unit(s) SubCutaneous at bedtime  insulin lispro (HumaLOG) corrective regimen sliding scale   SubCutaneous Before meals and at bedtime  insulin lispro Injectable (HumaLOG) 7 Unit(s) SubCutaneous three times a day before meals  metoprolol tartrate 12.5 milliGRAM(s) Oral <User Schedule>  multivitamin 1 Tablet(s) Oral daily  pantoprazole    Tablet 40 milliGRAM(s) Oral before breakfast  senna 2 Tablet(s) Oral at bedtime  sodium chloride 0.9% lock flush 3 milliLiter(s) IV Push every 8 hours  sodium chloride 0.9%. 1000 milliLiter(s) (10 mL/Hr) IV Continuous <Continuous>    MEDICATIONS  (PRN):  acetaminophen   Tablet .. 650 milliGRAM(s) Oral every 6 hours PRN Mild Pain (1 - 3)  bisacodyl Suppository 10 milliGRAM(s) Rectal daily PRN Constipation  dextrose 40% Gel 15 Gram(s) Oral once PRN Blood Glucose LESS THAN 70 milliGRAM(s)/deciliter  glucagon  Injectable 1 milliGRAM(s) IntraMuscular once PRN Glucose LESS THAN 70 milligrams/deciliter  ipratropium    for Nebulization 500 MICROGram(s) Nebulizer every 6 hours PRN Shortness of Breath and/or Wheezing  polyethylene glycol 3350 17 Gram(s) Oral daily PRN Constipation  traMADol 50 milliGRAM(s) Oral every 8 hours PRN Moderate Pain (4 - 6)      RADIOLOGY & ADDITIONAL TESTS:  CXR: < from: Xray Chest 1 View- PORTABLE-Routine (11.25.18 @ 03:29) >  Portable exam at chest demonstrates cardiomegaly. Patient status post   sternotomy. Bilateral effusions congestive change cannot be excluded.   Status post sternotomy.  Impression: Bilateral effusions. Congestive change cannot be excluded      A/P: 67 y/o M w/ PMH of HTN, HLD, CKD, DM uncontrolled, CVA now POD#5 from uncomplicated CABGx3 (LIMA-LAD, Sequential radial to OM and Ramus) EF 55%. PT doing well on 9L.     Neurovascular: No delirium. Pain well controlled with current regimen.  -PRN ultram and tylenol     Cardiovascular: Hemodynamically stable. HR controlled. POD#5, from OPCAB  -continue to monitor BP/HR/Tele   -increased lopressor to 12.5mg PO q6Hr, continue ASA/Plavix and lipitor    Lasis 10mg IV x1 today, Lasix 20mg PO tomorrow x3 days     Respiratory: 02 Sat = 98% on 2LNC.  -If on oxygen wean to RA from for O2 Sat > 93%.  -Lasix 10mg IV today  -Encourage C+DB and Use of IS 10x / hr while awake.  -CXR- PA/Lat tomorrow     GI: Stable.  -PPX- protonix  -PO Diet.    Renal / :  25/1.29 continue to trend daily labs   -Lasix x5 days ordered   -Monitor renal function.  -Monitor I/O's.    Endocrine:  h/o uncontrolled DM, endocrine team consulted and following  -A1c- 10   -continue RISS, Lantus 27u QHS, Humalog 7U TID  -TSH- f/u add on labs     Hematologic: H/H 9.6/29.6  -continue to trend daily labs .    ID: no acute issues   -Temp- aferbile  -CBC- WBC 7.5  -Observe for SIRS/Sepsis Syndrome.    Prophylaxis:  -DVT prophylaxis with 5000 SubQ Heparin q8h.  -SCD's    Disposition:  -transferred to  from 9E, d/c home tomorrow

## 2018-11-26 NOTE — CONSULT NOTE ADULT - ATTENDING COMMENTS
Pt seen on rounds this afternoon and hospital course reviewed.  Glucoses are well controlled on his current regimen but actually a bit too "tight."  Will therefore decrease the Lantus slightly to 24 units.  He may well be able to go back to oral agents but will need to be discharged initially on insulin.  His glucoses were slightly higher today than past 2 days, possibly due to improved PO intake, which is still not back to normal.  Will re-assess tomorrow.

## 2018-11-26 NOTE — CONSULT NOTE ADULT - SUBJECTIVE AND OBJECTIVE BOX
HPI: 68 year old Male, non-compliant with medical care with a PMHx significant for HTN, Hyperlipidemia, CKD stage 2,  poorly controlled DM (HGB A1C 10), h/o CVA in 2007 & 8/2017 without residual deficit. He presented to his cardiologist Dr. Jerry with c/o NOVA upon ambulation of <1 city block over past year. Cardiac cth on 11/12 showed severe 2 vessel disease, patient underwent on 11/21.     Age at Dx:  How dx:  Hx and duration of insulin:  Current Therapy:  Hx of hypoglycemia  Hx of DKA/HHS?    Home FSG:  Fasting  Lunch  Dinner  Bed    Diet:     Hx of other regimens  Complications:  Outpatient Endo:    Current Meds:  acetaminophen   Tablet .. 650 milliGRAM(s) Oral every 6 hours PRN  aspirin  chewable 81 milliGRAM(s) Oral daily  atorvastatin 20 milliGRAM(s) Oral at bedtime  bisacodyl Suppository 10 milliGRAM(s) Rectal daily PRN  clopidogrel Tablet 75 milliGRAM(s) Oral daily  dextrose 40% Gel 15 Gram(s) Oral once PRN  dextrose 5%. 1000 milliLiter(s) IV Continuous <Continuous>  dextrose 50% Injectable 50 milliLiter(s) IV Push every 15 minutes  dextrose 50% Injectable 25 milliLiter(s) IV Push every 15 minutes  docusate sodium 100 milliGRAM(s) Oral three times a day  folic acid 1 milliGRAM(s) Oral daily  glucagon  Injectable 1 milliGRAM(s) IntraMuscular once PRN  heparin  Injectable 5000 Unit(s) SubCutaneous every 8 hours  influenza   Vaccine 0.5 milliLiter(s) IntraMuscular once  insulin glargine Injectable (LANTUS) 27 Unit(s) SubCutaneous at bedtime  insulin lispro (HumaLOG) corrective regimen sliding scale   SubCutaneous Before meals and at bedtime  insulin lispro Injectable (HumaLOG) 7 Unit(s) SubCutaneous three times a day before meals  ipratropium    for Nebulization 500 MICROGram(s) Nebulizer every 6 hours PRN  metoprolol tartrate 12.5 milliGRAM(s) Oral <User Schedule>  multivitamin 1 Tablet(s) Oral daily  pantoprazole    Tablet 40 milliGRAM(s) Oral before breakfast  polyethylene glycol 3350 17 Gram(s) Oral daily PRN  senna 2 Tablet(s) Oral at bedtime  sodium chloride 0.9% lock flush 3 milliLiter(s) IV Push every 8 hours  sodium chloride 0.9%. 1000 milliLiter(s) IV Continuous <Continuous>  traMADol 50 milliGRAM(s) Oral every 8 hours PRN      Allergies:  No Known Allergies      ROS:  Denies the following except as indicated.    General: weight loss/weight gain, decreased appetite, fatigue  Eyes: Blurry vision, double vision, visual changes  ENT: Throat pain, changes in voice,   CV: palpitations, SOB, CP, cough  GI: NVD, difficulty swallowing, abdominal pain  : polyuria, dysuria  Endo: abnormal menses, temperature intolerance, decreased libido  MSK: weakness, joint pain  Skin: rash, dryness, diaphoresis  Heme: Easy bruising,bleeding  Neuro: HA, dizziness, lightheadedness, numbness tingling  Psych: Anxiety, Depression    Vital Signs Last 24 Hrs  T(C): 36.8 (26 Nov 2018 10:18), Max: 36.9 (25 Nov 2018 21:01)  T(F): 98.2 (26 Nov 2018 10:18), Max: 98.4 (25 Nov 2018 21:01)  HR: 74 (26 Nov 2018 09:00) (66 - 78)  BP: 145/76 (26 Nov 2018 09:00) (99/53 - 145/76)  BP(mean): 100 (26 Nov 2018 09:00) (66 - 111)  RR: 24 (26 Nov 2018 09:00) (12 - 24)  SpO2: 97% (26 Nov 2018 09:00) (91% - 98%)  Height (cm): 170.18 (11-21 @ 09:22)  Weight (kg): 79.4 (11-21 @ 09:22)  BMI (kg/m2): 27.4 (11-21 @ 09:22)      Constitutional: wn/wd in NAD.   HEENT: NCAT, MMM, OP clear, EOMI, , no proptosis or lid retraction  Neck: no thyromegaly or palpable thyroid nodules   Respiratory: lungs CTAB.  Cardiovascular: regular rhythm, normal S1 and S2, no audible murmurs, no peripheral edema  GI: soft, NT/ND, no masses/HSM appreciated.  Neurology: no tremors, DTR 2+  Skin: no visible rashes/lesions  Psychiatric: AAO x 3, normal affect/mood.  Ext: radial pulses intact, DP pulses intact, extremities warm, no cyanosis, clubbing or edema.       LABS:                        9.6    7.5   )-----------( 290      ( 26 Nov 2018 05:57 )             29.6     11-26    139  |  98  |  25<H>  ----------------------------<  135<H>  4.3   |  26  |  1.29    Ca    8.7      26 Nov 2018 05:57  Phos  3.2     11-25  Mg     2.2     11-26    TPro  6.4  /  Alb  3.5  /  TBili  0.5  /  DBili  x   /  AST  14  /  ALT  7<L>  /  AlkPhos  54  11-25    PT/INR - ( 25 Nov 2018 02:36 )   PT: 12.4 sec;   INR: 1.09          PTT - ( 25 Nov 2018 02:36 )  PTT:34.8 sec    Hemoglobin A1C, Whole Blood: 10.3 (11-12 @ 16:22)        RADIOLOGY & ADDITIONAL STUDIES:  CAPILLARY BLOOD GLUCOSE      POCT Blood Glucose.: 143 mg/dL (26 Nov 2018 10:56)  POCT Blood Glucose.: 134 mg/dL (26 Nov 2018 06:20)  POCT Blood Glucose.: 100 mg/dL (25 Nov 2018 20:42)  POCT Blood Glucose.: 97 mg/dL (25 Nov 2018 16:57)        A/P:68y Male    1.  DM  Please continue lantus       units at night / morning.  Please continue lispro      units before each meal.  Please continue lispro moderate / low dose sliding scale four times daily with meals and at bedtime    Pt's fingerstick glucose goal is     Will continue to monitor     For discharge, pt can continue    Pt can follow up at discharge with [facility] by calling  to make an appointment.   Will discuss case with     and update primary team Off note, patient is a poor historian who has poor knowledge of his disease.     HPI: 68 year old Male, non-compliant with medical care with a PMHx significant for HTN, Hyperlipidemia, CKD stage 2,  poorly controlled DM (HGB A1C 10), h/o CVA in 2007 & 8/2017 without residual deficit. He presented to his cardiologist Dr. Jerry with c/o NOVA upon ambulation of <1 city block over past year. Cardiac cath on 11/12 showed severe 2 vessel disease, patient underwent CABG on 11/21. He states he was diagnosed around 10 years ago, he was never on insulin and only on PO meds. He does not check his FS at home, but feels "unwell" when his sugars are high. He also states he feels "unwell" when his sugars are low but it does not occur that often. He denies any other previous hospitalizations for diabetes related complications.     Current Therapy: amaryl 4mg, januvia 100mg, met 500mg BID     Diet: Bkfast he drinks tea with some sugar, lunch he eats 1-2 homemade rotis (not too much ghee) with vegetable or meat castaneda dish with a small amount of white rice and dinner is around the same type of food. He denies eating sweets or drinking sugary drinks such as soda or juice. He states he also does not eat alot of fruits. occasionally will have an apple.     Hx of other regimens  Complications: Has neuropathy, states he had eye surgery in [place] over 20+ years ago. Unrelated to DM.   Outpatient Endo: None     Current Meds:  acetaminophen   Tablet .. 650 milliGRAM(s) Oral every 6 hours PRN  aspirin  chewable 81 milliGRAM(s) Oral daily  atorvastatin 20 milliGRAM(s) Oral at bedtime  bisacodyl Suppository 10 milliGRAM(s) Rectal daily PRN  clopidogrel Tablet 75 milliGRAM(s) Oral daily  docusate sodium 100 milliGRAM(s) Oral three times a day  folic acid 1 milliGRAM(s) Oral daily  glucagon  Injectable 1 milliGRAM(s) IntraMuscular once PRN  heparin  Injectable 5000 Unit(s) SubCutaneous every 8 hours  influenza   Vaccine 0.5 milliLiter(s) IntraMuscular once  insulin glargine Injectable (LANTUS) 27 Unit(s) SubCutaneous at bedtime  insulin lispro (HumaLOG) corrective regimen sliding scale   SubCutaneous Before meals and at bedtime  insulin lispro Injectable (HumaLOG) 7 Unit(s) SubCutaneous three times a day before meals  ipratropium    for Nebulization 500 MICROGram(s) Nebulizer every 6 hours PRN  metoprolol tartrate 12.5 milliGRAM(s) Oral <User Schedule>  multivitamin 1 Tablet(s) Oral daily  pantoprazole    Tablet 40 milliGRAM(s) Oral before breakfast  polyethylene glycol 3350 17 Gram(s) Oral daily PRN  senna 2 Tablet(s) Oral at bedtime  sodium chloride 0.9% lock flush 3 milliLiter(s) IV Push every 8 hours  sodium chloride 0.9%. 1000 milliLiter(s) IV Continuous <Continuous>  traMADol 50 milliGRAM(s) Oral every 8 hours PRN      Allergies:  No Known Allergies    ROS:  Denies the following except as indicated.    General: weight loss/weight gain, +decreased appetite, fatigue  Eyes: Blurry vision, double vision, visual changes  ENT: Throat pain, changes in voice,   CV: palpitations, SOB, CP, cough  GI: NVD, difficulty swallowing, abdominal pain  : polyuria, dysuria  Endo: abnormal menses, temperature intolerance, decreased libido  MSK: weakness, joint pain  Skin: rash, dryness, diaphoresis  Heme: Easy bruising, bleeding  Neuro: HA, dizziness, lightheadedness, numbness tingling  Psych: Anxiety, Depression    Vital Signs Last 24 Hrs  T(C): 36.8 (26 Nov 2018 10:18), Max: 36.9 (25 Nov 2018 21:01)  T(F): 98.2 (26 Nov 2018 10:18), Max: 98.4 (25 Nov 2018 21:01)  HR: 74 (26 Nov 2018 09:00) (66 - 78)  BP: 145/76 (26 Nov 2018 09:00) (99/53 - 145/76)  BP(mean): 100 (26 Nov 2018 09:00) (66 - 111)  RR: 24 (26 Nov 2018 09:00) (12 - 24)  SpO2: 97% (26 Nov 2018 09:00) (91% - 98%)  Height (cm): 170.18 (11-21 @ 09:22)  Weight (kg): 79.4 (11-21 @ 09:22)  BMI (kg/m2): 27.4 (11-21 @ 09:22)    Constitutional: wn/wd in NAD.   HEENT: NCAT, MMM, OP clear, EOMI, , no proptosis or lid retraction  Neck: no thyromegaly or palpable thyroid nodules   Respiratory: poor inspiratory effort   Cardiovascular: regular rhythm, normal S1 and S2, no audible murmurs, no peripheral edema, +CABG scar   GI: soft, NT/ND, no masses/HSM appreciated.  Neurology: no tremors, DTR 2+  Skin: no visible rashes/lesions  Psychiatric: AAO x 3, normal affect/mood.  Ext: radial pulses intact, DP pulses intact, extremities warm, no cyanosis, clubbing or edema.       LABS:                        9.6    7.5   )-----------( 290      ( 26 Nov 2018 05:57 )             29.6     11-26    139  |  98  |  25<H>  ----------------------------<  135<H>  4.3   |  26  |  1.29    Ca    8.7      26 Nov 2018 05:57  Phos  3.2     11-25  Mg     2.2     11-26    TPro  6.4  /  Alb  3.5  /  TBili  0.5  /  DBili  x   /  AST  14  /  ALT  7<L>  /  AlkPhos  54  11-25  PT/INR - ( 25 Nov 2018 02:36 )   PT: 12.4 sec;   INR: 1.09     PTT - ( 25 Nov 2018 02:36 )  PTT:34.8 sec  Hemoglobin A1C, Whole Blood: 10.3 (11-12 @ 16:22)    CAPILLARY BLOOD GLUCOSE  POCT Blood Glucose.: 143 mg/dL (26 Nov 2018 10:56)  POCT Blood Glucose.: 134 mg/dL (26 Nov 2018 06:20)  POCT Blood Glucose.: 100 mg/dL (25 Nov 2018 20:42)  POCT Blood Glucose.: 97 mg/dL (25 Nov 2018 16:57)    A/P:  68 year old Male, non-compliant with medical care with a PMHx significant for HTN, Hyperlipidemia, CKD stage 2,  poorly controlled DM (HGB A1C 10), h/o CVA without residual deficit s/p CABG.      1.  DM type 2, complicated, poorly controlled.   Please continue lantus  units at night  Please continue lispro  units before each meal.    Please continue lispro moderate dose sliding scale four times daily with meals and at bedtime  Pt's fingerstick glucose goal is   Will continue to monitor     Pt can follow up at discharge with [facility] by calling  to make an appointment.   Will discuss case with     and update primary team Off note, patient is a poor historian who has poor knowledge of his disease.     HPI: 68 year old Male, non-compliant with medical care with a PMHx significant for HTN, Hyperlipidemia, CKD stage 2,  poorly controlled DM (HGB A1C 10), h/o CVA in 2007 & 8/2017 without residual deficit. He presented to his cardiologist Dr. Jerry with c/o NOVA upon ambulation of <1 city block over past year. Cardiac cath on 11/12 showed severe 2 vessel disease, patient underwent CABG on 11/21. He states he was diagnosed around 10 years ago, he was never on insulin and only on PO meds. He does not check his FS at home, but feels "unwell" when his sugars are high. He also states he feels "unwell" when his sugars are low but it does not occur that often. He denies any other previous hospitalizations for diabetes related complications.     Current Therapy: amaryl 4mg, januvia 100mg, met 500mg BID     Diet: Bkfast he drinks tea with some sugar, lunch he eats 1-2 homemade rotis (not too much ghee) with vegetable or meat castaneda dish with a small amount of white rice and dinner is around the same type of food. He denies eating sweets or drinking sugary drinks such as soda or juice. He states he also does not eat alot of fruits. occasionally will have an apple.     Hx of other regimens  Complications: Has neuropathy, states he had eye surgery in [place] over 20+ years ago. Unrelated to DM.   Outpatient Endo: None     Current Meds:  acetaminophen   Tablet .. 650 milliGRAM(s) Oral every 6 hours PRN  aspirin  chewable 81 milliGRAM(s) Oral daily  atorvastatin 20 milliGRAM(s) Oral at bedtime  bisacodyl Suppository 10 milliGRAM(s) Rectal daily PRN  clopidogrel Tablet 75 milliGRAM(s) Oral daily  docusate sodium 100 milliGRAM(s) Oral three times a day  folic acid 1 milliGRAM(s) Oral daily  glucagon  Injectable 1 milliGRAM(s) IntraMuscular once PRN  heparin  Injectable 5000 Unit(s) SubCutaneous every 8 hours  influenza   Vaccine 0.5 milliLiter(s) IntraMuscular once  insulin glargine Injectable (LANTUS) 27 Unit(s) SubCutaneous at bedtime  insulin lispro (HumaLOG) corrective regimen sliding scale   SubCutaneous Before meals and at bedtime  insulin lispro Injectable (HumaLOG) 7 Unit(s) SubCutaneous three times a day before meals  ipratropium    for Nebulization 500 MICROGram(s) Nebulizer every 6 hours PRN  metoprolol tartrate 12.5 milliGRAM(s) Oral <User Schedule>  multivitamin 1 Tablet(s) Oral daily  pantoprazole    Tablet 40 milliGRAM(s) Oral before breakfast  polyethylene glycol 3350 17 Gram(s) Oral daily PRN  senna 2 Tablet(s) Oral at bedtime  sodium chloride 0.9% lock flush 3 milliLiter(s) IV Push every 8 hours  sodium chloride 0.9%. 1000 milliLiter(s) IV Continuous <Continuous>  traMADol 50 milliGRAM(s) Oral every 8 hours PRN      Allergies:  No Known Allergies    ROS:  Denies the following except as indicated.    General: weight loss/weight gain, +decreased appetite, fatigue  Eyes: Blurry vision, double vision, visual changes  ENT: Throat pain, changes in voice,   CV: palpitations, SOB, CP, cough  GI: NVD, difficulty swallowing, abdominal pain  : polyuria, dysuria  Endo: abnormal menses, temperature intolerance, decreased libido  MSK: weakness, joint pain  Skin: rash, dryness, diaphoresis  Heme: Easy bruising, bleeding  Neuro: HA, dizziness, lightheadedness, numbness tingling  Psych: Anxiety, Depression    Vital Signs Last 24 Hrs  T(C): 36.8 (26 Nov 2018 10:18), Max: 36.9 (25 Nov 2018 21:01)  T(F): 98.2 (26 Nov 2018 10:18), Max: 98.4 (25 Nov 2018 21:01)  HR: 74 (26 Nov 2018 09:00) (66 - 78)  BP: 145/76 (26 Nov 2018 09:00) (99/53 - 145/76)  BP(mean): 100 (26 Nov 2018 09:00) (66 - 111)  RR: 24 (26 Nov 2018 09:00) (12 - 24)  SpO2: 97% (26 Nov 2018 09:00) (91% - 98%)  Height (cm): 170.18 (11-21 @ 09:22)  Weight (kg): 79.4 (11-21 @ 09:22)  BMI (kg/m2): 27.4 (11-21 @ 09:22)    Constitutional: wn/wd in NAD.   HEENT: NCAT, MMM, OP clear, EOMI, , no proptosis or lid retraction  Neck: no thyromegaly or palpable thyroid nodules   Respiratory: poor inspiratory effort   Cardiovascular: regular rhythm, normal S1 and S2, no audible murmurs, no peripheral edema, +CABG scar   GI: soft, NT/ND, no masses/HSM appreciated.  Neurology: no tremors, DTR 2+  Skin: no visible rashes/lesions  Psychiatric: AAO x 3, normal affect/mood.  Ext: radial pulses intact, DP pulses intact, extremities warm, no cyanosis, clubbing or edema.       LABS:                        9.6    7.5   )-----------( 290      ( 26 Nov 2018 05:57 )             29.6     11-26    139  |  98  |  25<H>  ----------------------------<  135<H>  4.3   |  26  |  1.29    Ca    8.7      26 Nov 2018 05:57  Phos  3.2     11-25  Mg     2.2     11-26    TPro  6.4  /  Alb  3.5  /  TBili  0.5  /  DBili  x   /  AST  14  /  ALT  7<L>  /  AlkPhos  54  11-25  PT/INR - ( 25 Nov 2018 02:36 )   PT: 12.4 sec;   INR: 1.09     PTT - ( 25 Nov 2018 02:36 )  PTT:34.8 sec  Hemoglobin A1C, Whole Blood: 10.3 (11-12 @ 16:22)    CAPILLARY BLOOD GLUCOSE  POCT Blood Glucose.: 143 mg/dL (26 Nov 2018 10:56)  POCT Blood Glucose.: 134 mg/dL (26 Nov 2018 06:20)  POCT Blood Glucose.: 100 mg/dL (25 Nov 2018 20:42)  POCT Blood Glucose.: 97 mg/dL (25 Nov 2018 16:57)    A/P:  68 year old Male, non-compliant with medical care with a PMHx significant for HTN, Hyperlipidemia, CKD stage 2,  poorly controlled DM (HGB A1C 10), h/o CVA without residual deficit s/p CABG.      1.  DM type 2, complicated, poorly controlled.   Please decrease lantus to 24 units at night  Please continue lispro 7 units before each meal.    Please continue lispro moderate dose sliding scale four times daily with meals and at bedtime  Pt's fingerstick glucose goal is   Will continue to monitor     Pt can follow up at discharge with [facility] by calling  to make an appointment.   Will discuss case with Dr. Thompson  and update primary team

## 2018-11-26 NOTE — DIETITIAN INITIAL EVALUATION ADULT. - ENERGY NEEDS
Ht 170.18cm; Wt 79.4Kg  IBW 67.3Kg; %%  BMI 27.4    Utilized ABW to calculate needs, pt falls within % of IBW. Adjusted for post-op/age.

## 2018-11-26 NOTE — DIETITIAN INITIAL EVALUATION ADULT. - PERTINENT MEDS FT
aspirin, heparin, plavix, lopressor, dulcolax, lantus, humalog, D5, D50, dex gel, glucagon, SSI, colace, folic acid, MVI, protonix, miralax, senna

## 2018-11-27 VITALS
HEART RATE: 74 BPM | OXYGEN SATURATION: 91 % | RESPIRATION RATE: 17 BRPM | DIASTOLIC BLOOD PRESSURE: 73 MMHG | SYSTOLIC BLOOD PRESSURE: 133 MMHG

## 2018-11-27 DIAGNOSIS — I25.10 ATHEROSCLEROTIC HEART DISEASE OF NATIVE CORONARY ARTERY WITHOUT ANGINA PECTORIS: ICD-10-CM

## 2018-11-27 PROBLEM — N18.2 CHRONIC KIDNEY DISEASE, STAGE 2 (MILD): Chronic | Status: ACTIVE | Noted: 2018-11-15

## 2018-11-27 LAB
ANION GAP SERPL CALC-SCNC: 11 MMOL/L — SIGNIFICANT CHANGE UP (ref 5–17)
BASOPHILS NFR BLD AUTO: 0.3 % — SIGNIFICANT CHANGE UP (ref 0–2)
BUN SERPL-MCNC: 24 MG/DL — HIGH (ref 7–23)
CALCIUM SERPL-MCNC: 8.6 MG/DL — SIGNIFICANT CHANGE UP (ref 8.4–10.5)
CHLORIDE SERPL-SCNC: 100 MMOL/L — SIGNIFICANT CHANGE UP (ref 96–108)
CO2 SERPL-SCNC: 27 MMOL/L — SIGNIFICANT CHANGE UP (ref 22–31)
CREAT SERPL-MCNC: 1.23 MG/DL — SIGNIFICANT CHANGE UP (ref 0.5–1.3)
EOSINOPHIL NFR BLD AUTO: 5.9 % — SIGNIFICANT CHANGE UP (ref 0–6)
GLUCOSE BLDC GLUCOMTR-MCNC: 189 MG/DL — HIGH (ref 70–99)
GLUCOSE BLDC GLUCOMTR-MCNC: 219 MG/DL — HIGH (ref 70–99)
GLUCOSE SERPL-MCNC: 143 MG/DL — HIGH (ref 70–99)
HCT VFR BLD CALC: 30.8 % — LOW (ref 39–50)
HGB BLD-MCNC: 10 G/DL — LOW (ref 13–17)
LYMPHOCYTES # BLD AUTO: 19 % — SIGNIFICANT CHANGE UP (ref 13–44)
MAGNESIUM SERPL-MCNC: 2 MG/DL — SIGNIFICANT CHANGE UP (ref 1.6–2.6)
MCHC RBC-ENTMCNC: 29.2 PG — SIGNIFICANT CHANGE UP (ref 27–34)
MCHC RBC-ENTMCNC: 32.5 G/DL — SIGNIFICANT CHANGE UP (ref 32–36)
MCV RBC AUTO: 90.1 FL — SIGNIFICANT CHANGE UP (ref 80–100)
MONOCYTES NFR BLD AUTO: 12 % — SIGNIFICANT CHANGE UP (ref 2–14)
NEUTROPHILS NFR BLD AUTO: 62.8 % — SIGNIFICANT CHANGE UP (ref 43–77)
PHOSPHATE SERPL-MCNC: 3.7 MG/DL — SIGNIFICANT CHANGE UP (ref 2.5–4.5)
PLATELET # BLD AUTO: 308 K/UL — SIGNIFICANT CHANGE UP (ref 150–400)
POTASSIUM SERPL-MCNC: 3.9 MMOL/L — SIGNIFICANT CHANGE UP (ref 3.5–5.3)
POTASSIUM SERPL-SCNC: 3.9 MMOL/L — SIGNIFICANT CHANGE UP (ref 3.5–5.3)
RBC # BLD: 3.42 M/UL — LOW (ref 4.2–5.8)
RBC # FLD: 12.7 % — SIGNIFICANT CHANGE UP (ref 10.3–16.9)
SODIUM SERPL-SCNC: 138 MMOL/L — SIGNIFICANT CHANGE UP (ref 135–145)
WBC # BLD: 7.8 K/UL — SIGNIFICANT CHANGE UP (ref 3.8–10.5)
WBC # FLD AUTO: 7.8 K/UL — SIGNIFICANT CHANGE UP (ref 3.8–10.5)

## 2018-11-27 PROCEDURE — 99232 SBSQ HOSP IP/OBS MODERATE 35: CPT | Mod: GC

## 2018-11-27 RX ORDER — METOPROLOL TARTRATE 50 MG
1 TABLET ORAL
Qty: 0 | Refills: 0 | COMMUNITY

## 2018-11-27 RX ORDER — ASPIRIN/CALCIUM CARB/MAGNESIUM 324 MG
1 TABLET ORAL
Qty: 30 | Refills: 0 | OUTPATIENT
Start: 2018-11-27 | End: 2018-12-26

## 2018-11-27 RX ORDER — METOPROLOL TARTRATE 50 MG
0.5 TABLET ORAL
Qty: 30 | Refills: 0 | OUTPATIENT
Start: 2018-11-27 | End: 2018-12-26

## 2018-11-27 RX ORDER — ACETAMINOPHEN 500 MG
2 TABLET ORAL
Qty: 240 | Refills: 0 | OUTPATIENT
Start: 2018-11-27 | End: 2018-12-26

## 2018-11-27 RX ORDER — INSULIN GLARGINE 100 [IU]/ML
25 INJECTION, SOLUTION SUBCUTANEOUS
Qty: 1 | Refills: 0 | OUTPATIENT
Start: 2018-11-27 | End: 2018-12-26

## 2018-11-27 RX ORDER — POTASSIUM CHLORIDE 20 MEQ
20 PACKET (EA) ORAL ONCE
Qty: 0 | Refills: 0 | Status: COMPLETED | OUTPATIENT
Start: 2018-11-27 | End: 2018-11-27

## 2018-11-27 RX ORDER — SITAGLIPTIN 50 MG/1
1 TABLET, FILM COATED ORAL
Qty: 0 | Refills: 0 | COMMUNITY

## 2018-11-27 RX ORDER — INSULIN LISPRO 100/ML
7 VIAL (ML) SUBCUTANEOUS
Qty: 1 | Refills: 0 | OUTPATIENT
Start: 2018-11-27 | End: 2018-12-26

## 2018-11-27 RX ORDER — LOSARTAN/HYDROCHLOROTHIAZIDE 100MG-25MG
1 TABLET ORAL
Qty: 0 | Refills: 0 | COMMUNITY

## 2018-11-27 RX ORDER — CLOPIDOGREL BISULFATE 75 MG/1
1 TABLET, FILM COATED ORAL
Qty: 30 | Refills: 0 | OUTPATIENT
Start: 2018-11-27 | End: 2018-12-26

## 2018-11-27 RX ORDER — METFORMIN HYDROCHLORIDE 850 MG/1
1 TABLET ORAL
Qty: 0 | Refills: 0 | COMMUNITY

## 2018-11-27 RX ORDER — AMLODIPINE BESYLATE 2.5 MG/1
2.5 TABLET ORAL
Qty: 0 | Refills: 0 | Status: DISCONTINUED | OUTPATIENT
Start: 2018-11-27 | End: 2018-11-27

## 2018-11-27 RX ORDER — ASPIRIN/CALCIUM CARB/MAGNESIUM 324 MG
1 TABLET ORAL
Qty: 0 | Refills: 0 | COMMUNITY

## 2018-11-27 RX ORDER — PANTOPRAZOLE SODIUM 20 MG/1
1 TABLET, DELAYED RELEASE ORAL
Qty: 30 | Refills: 0 | OUTPATIENT
Start: 2018-11-27 | End: 2018-12-26

## 2018-11-27 RX ORDER — DOCUSATE SODIUM 100 MG
1 CAPSULE ORAL
Qty: 21 | Refills: 0 | OUTPATIENT
Start: 2018-11-27 | End: 2018-12-03

## 2018-11-27 RX ORDER — ATORVASTATIN CALCIUM 80 MG/1
1 TABLET, FILM COATED ORAL
Qty: 0 | Refills: 0 | COMMUNITY

## 2018-11-27 RX ORDER — GLIMEPIRIDE 1 MG
1 TABLET ORAL
Qty: 0 | Refills: 0 | COMMUNITY

## 2018-11-27 RX ORDER — ISOPROPYL ALCOHOL, BENZOCAINE .7; .06 ML/ML; ML/ML
0 SWAB TOPICAL
Qty: 1 | Refills: 0 | OUTPATIENT
Start: 2018-11-27

## 2018-11-27 RX ORDER — FOLIC ACID 0.8 MG
1 TABLET ORAL
Qty: 0 | Refills: 0 | COMMUNITY
Start: 2018-11-27

## 2018-11-27 RX ORDER — ATORVASTATIN CALCIUM 80 MG/1
1 TABLET, FILM COATED ORAL
Qty: 30 | Refills: 0 | OUTPATIENT
Start: 2018-11-27 | End: 2018-12-26

## 2018-11-27 RX ORDER — AMLODIPINE BESYLATE 2.5 MG/1
1 TABLET ORAL
Qty: 30 | Refills: 0 | OUTPATIENT
Start: 2018-11-27 | End: 2018-12-26

## 2018-11-27 RX ORDER — TRAMADOL HYDROCHLORIDE 50 MG/1
1 TABLET ORAL
Qty: 21 | Refills: 0 | OUTPATIENT
Start: 2018-11-27 | End: 2018-12-03

## 2018-11-27 RX ADMIN — HEPARIN SODIUM 5000 UNIT(S): 5000 INJECTION INTRAVENOUS; SUBCUTANEOUS at 06:02

## 2018-11-27 RX ADMIN — SODIUM CHLORIDE 3 MILLILITER(S): 9 INJECTION INTRAMUSCULAR; INTRAVENOUS; SUBCUTANEOUS at 06:02

## 2018-11-27 RX ADMIN — Medication 20 MILLIGRAM(S): at 06:02

## 2018-11-27 RX ADMIN — CLOPIDOGREL BISULFATE 75 MILLIGRAM(S): 75 TABLET, FILM COATED ORAL at 11:21

## 2018-11-27 RX ADMIN — Medication 20 MILLIEQUIVALENT(S): at 09:36

## 2018-11-27 RX ADMIN — PANTOPRAZOLE SODIUM 40 MILLIGRAM(S): 20 TABLET, DELAYED RELEASE ORAL at 06:02

## 2018-11-27 RX ADMIN — Medication 4: at 07:19

## 2018-11-27 RX ADMIN — Medication 12.5 MILLIGRAM(S): at 06:02

## 2018-11-27 RX ADMIN — Medication 100 MILLIGRAM(S): at 06:02

## 2018-11-27 RX ADMIN — Medication 1 TABLET(S): at 11:21

## 2018-11-27 RX ADMIN — Medication 7 UNIT(S): at 12:01

## 2018-11-27 RX ADMIN — Medication 650 MILLIGRAM(S): at 02:22

## 2018-11-27 RX ADMIN — AMLODIPINE BESYLATE 2.5 MILLIGRAM(S): 2.5 TABLET ORAL at 09:36

## 2018-11-27 RX ADMIN — Medication 2: at 12:01

## 2018-11-27 RX ADMIN — Medication 81 MILLIGRAM(S): at 11:21

## 2018-11-27 RX ADMIN — Medication 1 MILLIGRAM(S): at 11:21

## 2018-11-27 RX ADMIN — Medication 650 MILLIGRAM(S): at 03:22

## 2018-11-27 RX ADMIN — Medication 7 UNIT(S): at 07:20

## 2018-11-27 NOTE — PROGRESS NOTE ADULT - SUBJECTIVE AND OBJECTIVE BOX
Patient discussed on morning rounds with Dr. Gallo    Operation / Date: OPCAB x 3  11/21/2018  LIMA to LAD  sequential radial to OM and ramus EF 55%    Surgeon: Dr. Gallo, Rosalio    Referring Physician: Dr. Sunny Jerry     SUBJECTIVE ASSESSMENT: Translated by daughter Malorie  Patient feels well today.  Has some left side thumb "soreness and tingling" (radial site).  States he has been using his IS (pulls 1L) and ambulates in the hallway.  Walked "9-10 times yesterday" without SOB or CP on ambulation.  Has good PO appetite, ate an egg burrito for breakfast.  Had a BM this am.  Denies incisional pain, CP, SOB, N/V/D, fever, chills or dizziness.     Hospital Course: This is a 69 y/o male with PMHx HTN, HLD, CKD stage II, poorly controlled DM (10% A1C), and previous CVA w/o residual deficit.  He presented with NOVA and on 11/21/18 underwent an OPCAB x 3, uneventful.  POD#1 extubated, POD#2-5 remained in ICU for encouragement for pulmonary toilet and mobilization, remained stable.  POD#5 transferred to [facility], having frequent PACs.  Titrated up beta blocker.  Endocrine consulted and patient remained on insulin while in-house.  Small B/L pleural effusions on CXR, no lasix needed per Dr. Gallo.  Weaned to room air, ambulating, eating and using IS. Stable for D/C home today per Dr. Gallo.     Vital Signs Last 24 Hrs  T(C): 36.9 (27 Nov 2018 09:42), Max: 37.3 (26 Nov 2018 21:21)  T(F): 98.4 (27 Nov 2018 09:42), Max: 99.2 (26 Nov 2018 21:21)  HR: 66 (27 Nov 2018 08:55) (62 - 74)  BP: 141/67 (27 Nov 2018 08:55) (113/62 - 155/66)  BP(mean): 101 (27 Nov 2018 08:55) (74 - 101)  RR: 18 (27 Nov 2018 08:55) (18 - 20)  SpO2: 92% (27 Nov 2018 08:55) (91% - 94%)  I&O's Detail    26 Nov 2018 07:01  -  27 Nov 2018 07:00  --------------------------------------------------------  IN:    Oral Fluid: 520 mL  Total IN: 520 mL    OUT:    Voided: 740 mL  Total OUT: 740 mL    Total NET: -220 mL      EPICARDIAL WIRES REMOVED: Yes.  Removed this am.  Sterilized wires and cut w/o issues.   TIE DOWNS REMOVED: No, removed this am.     PHYSICAL EXAM:    GEN: NAD, looks comfortable. Sitting up in chair. Smiling. Daughter present.   Psych: Mood appropriate  Neuro: A&Ox3.  No focal deficits.  Moving all extremities.   HEENT: No obvious abnormalities  CV: S1S2, regular, no murmurs appreciated.  No carotid bruits.  No JVD  Lungs: Diminished at bases. No wheezing, rales or rhonchi  ABD: Soft, non-tender, non-distended.  +Bowel sounds  EXT: Warm and well perfused.  No peripheral edema noted  Musculoskeletal: Moving all extremities with normal ROM, no joint swelling  PV: Pedal pulses palpable  Incision Sites: Left radial site clean/dry/open to air.  MSI clean and dry.  Open to air.     LABS:                        10.0   7.8   )-----------( 308      ( 27 Nov 2018 06:09 )             30.8       COUMADIN:  No      11-27    138  |  100  |  24<H>  ----------------------------<  143<H>  3.9   |  27  |  1.23    Ca    8.6      27 Nov 2018 06:09  Phos  3.7     11-27  Mg     2.0     11-27            MEDICATIONS  (STANDING):  amLODIPine   Tablet 2.5 milliGRAM(s) Oral <User Schedule>  aspirin  chewable 81 milliGRAM(s) Oral daily  atorvastatin 20 milliGRAM(s) Oral at bedtime  clopidogrel Tablet 75 milliGRAM(s) Oral daily  dextrose 5%. 1000 milliLiter(s) (50 mL/Hr) IV Continuous <Continuous>  dextrose 50% Injectable 50 milliLiter(s) IV Push every 15 minutes  dextrose 50% Injectable 25 milliLiter(s) IV Push every 15 minutes  docusate sodium 100 milliGRAM(s) Oral three times a day  folic acid 1 milliGRAM(s) Oral daily  heparin  Injectable 5000 Unit(s) SubCutaneous every 8 hours  influenza   Vaccine 0.5 milliLiter(s) IntraMuscular once  insulin glargine Injectable (LANTUS) 24 Unit(s) SubCutaneous at bedtime  insulin lispro (HumaLOG) corrective regimen sliding scale   SubCutaneous Before meals and at bedtime  insulin lispro Injectable (HumaLOG) 7 Unit(s) SubCutaneous three times a day before meals  metoprolol tartrate 12.5 milliGRAM(s) Oral every 12 hours  multivitamin 1 Tablet(s) Oral daily  pantoprazole    Tablet 40 milliGRAM(s) Oral before breakfast  senna 2 Tablet(s) Oral at bedtime  sodium chloride 0.9% lock flush 3 milliLiter(s) IV Push every 8 hours  sodium chloride 0.9%. 1000 milliLiter(s) (10 mL/Hr) IV Continuous <Continuous>      Discharge CXR: < from: Xray Chest 2 Views PA/Lat (11.26.18 @ 09:46) >    IMPRESSION: Small pleural effusions. Cardiomegaly.    < end of copied text >      Discharge ECHO: N/A

## 2018-11-27 NOTE — PROGRESS NOTE ADULT - ASSESSMENT
See above hospital course.  Pt will F/U wtih Dr. Gallo 12/4/18.  Dr. Jerry 12/10/18 and Endocrine 12/6/18.

## 2018-11-27 NOTE — PROGRESS NOTE ADULT - SUBJECTIVE AND OBJECTIVE BOX
INTERVAL HPI/OVERNIGHT EVENTS:    68 year old Male, non-compliant with medical care with a PMHx significant for HTN, Hyperlipidemia, CKD stage 2,  poorly controlled DM (HGB A1C 10), h/o CVA without residual deficit s/p CABG. He ate about half of his dinner yesterday. States he did not eat anything after between dinner and breakfast. His daughter is at bedside, states she lives with him and can provide adequate help and support.     FSG & Insulin received:  Yesterday:  pre-dinner fs  nutritional lispro  7 units + 0  units lispro SS  bedtime fs  lantus 24  units +  0  units lispro SS    Today:  pre-breakfast fs  nutritional lispro 7  units+  4  units lispro SS    Pt reports the following symptoms:  CONSTITUTIONAL:  Negative fever or chills, feels well, good appetite  EYES:  Negative  blurry vision or double vision  CARDIOVASCULAR:  Negative for chest pain or palpitations  RESPIRATORY:  Negative for cough, wheezing, or SOB   GASTROINTESTINAL:  Negative for nausea, vomiting, diarrhea, constipation, or abdominal pain  GENITOURINARY:  Negative frequency, urgency or dysuria  NEUROLOGIC:  No headache, confusion, dizziness, lightheadedness    MEDICATIONS  (STANDING):  amLODIPine   Tablet 2.5 milliGRAM(s) Oral <User Schedule>  aspirin  chewable 81 milliGRAM(s) Oral daily  atorvastatin 20 milliGRAM(s) Oral at bedtime  clopidogrel Tablet 75 milliGRAM(s) Oral daily  dextrose 5%. 1000 milliLiter(s) (50 mL/Hr) IV Continuous <Continuous>  dextrose 50% Injectable 50 milliLiter(s) IV Push every 15 minutes  dextrose 50% Injectable 25 milliLiter(s) IV Push every 15 minutes  docusate sodium 100 milliGRAM(s) Oral three times a day  folic acid 1 milliGRAM(s) Oral daily  heparin  Injectable 5000 Unit(s) SubCutaneous every 8 hours  influenza   Vaccine 0.5 milliLiter(s) IntraMuscular once  insulin glargine Injectable (LANTUS) 24 Unit(s) SubCutaneous at bedtime  insulin lispro (HumaLOG) corrective regimen sliding scale   SubCutaneous Before meals and at bedtime  insulin lispro Injectable (HumaLOG) 7 Unit(s) SubCutaneous three times a day before meals  metoprolol tartrate 12.5 milliGRAM(s) Oral every 12 hours  multivitamin 1 Tablet(s) Oral daily  pantoprazole    Tablet 40 milliGRAM(s) Oral before breakfast  senna 2 Tablet(s) Oral at bedtime  sodium chloride 0.9% lock flush 3 milliLiter(s) IV Push every 8 hours  sodium chloride 0.9%. 1000 milliLiter(s) (10 mL/Hr) IV Continuous <Continuous>    MEDICATIONS  (PRN):  acetaminophen   Tablet .. 650 milliGRAM(s) Oral every 6 hours PRN Mild Pain (1 - 3)  bisacodyl Suppository 10 milliGRAM(s) Rectal daily PRN Constipation  dextrose 40% Gel 15 Gram(s) Oral once PRN Blood Glucose LESS THAN 70 milliGRAM(s)/deciliter  glucagon  Injectable 1 milliGRAM(s) IntraMuscular once PRN Glucose LESS THAN 70 milligrams/deciliter  ipratropium    for Nebulization 500 MICROGram(s) Nebulizer every 6 hours PRN Shortness of Breath and/or Wheezing  polyethylene glycol 3350 17 Gram(s) Oral daily PRN Constipation  traMADol 50 milliGRAM(s) Oral every 8 hours PRN Moderate Pain (4 - 6)      PHYSICAL EXAM  Vital Signs Last 24 Hrs  T(C): 36.9 (2018 09:42), Max: 37.3 (2018 21:21)  T(F): 98.4 (2018 09:42), Max: 99.2 (2018 21:21)  HR: 66 (2018 08:55) (62 - 74)  BP: 141/67 (2018 08:55) (113/62 - 155/66)  BP(mean): 101 (2018 08:55) (74 - 101)  RR: 18 (2018 08:55) (18 - 20)  SpO2: 92% (2018 08:55) (91% - 94%)    Constitutional: wn/wd in NAD.   HEENT: NCAT, no proptosis or lid retraction  Neck: no thyromegaly or palpable thyroid nodules   Respiratory: poor inspiratory effort   Cardiovascular: regular rhythm, normal S1 and S2, no audible murmurs, no peripheral edema, +CABG scar   GI: soft, NT/ND, no masses/HSM appreciated.  Neurology: no tremors, DTR 2+  Skin: no visible rashes/lesions  Psychiatric: AAO x 3, normal affect/mood.  Ext: radial pulses intact, DP pulses intact, extremities warm, no cyanosis, clubbing or edema.     LABS:                        10.0   7.8   )-----------( 308      ( 2018 06:09 )             30.8         138  |  100  |  24<H>  ----------------------------<  143<H>  3.9   |  27  |  1.23    Ca    8.6      2018 06:09  Phos  3.7       Mg     2.0         Thyroid Stimulating Hormone, Serum: 1.978 uIU/mL ( @ 05:57)    HbA1C: 10.3 % ( @ 16:22)    CAPILLARY BLOOD GLUCOSE  POCT Blood Glucose.: 219 mg/dL (2018 07:00)  POCT Blood Glucose.: 120 mg/dL (2018 21:50)  POCT Blood Glucose.: 109 mg/dL (2018 16:38)  POCT Blood Glucose.: 143 mg/dL (2018 10:56)    A/P:  68 year old Male, non-compliant with medical care with a PMHx significant for HTN, Hyperlipidemia, CKD stage 2,  poorly controlled DM (HGB A1C 10), h/o CVA without residual deficit s/p CABG.      1.  DM type 2, complicated, poorly controlled.     Discharge recs:   Lantus __ units at night  Please continue lispro __ units before each meal.    Please continue lispro moderate dose sliding scale four times daily with meals and at bedtime  Pt's fingerstick glucose goal is 120-150  [facility] states going to an endocrinologist in [place] is easier for him as he is dependant on others for transportation.   Will continue to monitor     Pt can follow up at discharge with [facility] by calling  to make an appointment.   Will discuss case with Dr. Thompson  and update primary team

## 2018-11-27 NOTE — PROGRESS NOTE ADULT - ATTENDING COMMENTS
Pt seen on rounds this morning prior to discharge.  Glucoses were excellent last night (109/120) but hao to 219 this morning.  Pt denies having eaten after bedtime or overnight, but would have to suspect that he did for rise of this magnitude.  Will not "kapil" this with a marked increase in Lantus, but rather discharge on 25 units, with pre-meal still at 7 units.  Discussed plans with his daughter, also discussed diet in terms of appropriate portions of starch and fruit at each meal.

## 2018-11-28 ENCOUNTER — APPOINTMENT (OUTPATIENT)
Age: 68
End: 2018-11-28
Payer: MEDICAID

## 2018-11-28 PROBLEM — Z86.39 HISTORY OF HYPERLIPIDEMIA: Status: RESOLVED | Noted: 2018-11-28 | Resolved: 2018-11-28

## 2018-11-28 PROBLEM — Z86.79 HISTORY OF HYPERTENSION: Status: RESOLVED | Noted: 2018-11-28 | Resolved: 2018-11-28

## 2018-11-28 PROBLEM — Z95.1 S/P CABG X 3: Status: ACTIVE | Noted: 2018-11-28

## 2018-11-28 PROBLEM — N18.2 CKD (CHRONIC KIDNEY DISEASE), STAGE II: Status: RESOLVED | Noted: 2018-11-28 | Resolved: 2018-11-28

## 2018-11-28 PROBLEM — Z87.891 FORMER SMOKER: Status: ACTIVE | Noted: 2018-11-28

## 2018-11-28 PROBLEM — Z86.39 HISTORY OF DIABETES MELLITUS: Status: RESOLVED | Noted: 2018-11-28 | Resolved: 2018-11-28

## 2018-11-28 PROBLEM — Z86.73 HISTORY OF CVA (CEREBROVASCULAR ACCIDENT): Status: RESOLVED | Noted: 2018-11-28 | Resolved: 2018-11-28

## 2018-11-28 PROBLEM — Z09 POSTOP CHECK: Status: ACTIVE | Noted: 2018-11-28

## 2018-11-28 PROCEDURE — 99024 POSTOP FOLLOW-UP VISIT: CPT

## 2018-11-28 RX ORDER — SITAGLIPTIN 100 MG/1
100 TABLET, FILM COATED ORAL DAILY
Qty: 30 | Refills: 0 | Status: COMPLETED | COMMUNITY
End: 2018-11-28

## 2018-11-28 RX ORDER — TRAMADOL HYDROCHLORIDE 50 MG/1
50 TABLET, COATED ORAL 3 TIMES DAILY
Qty: 30 | Refills: 0 | Status: ACTIVE | COMMUNITY

## 2018-11-28 RX ORDER — PANTOPRAZOLE 40 MG/1
40 TABLET, DELAYED RELEASE ORAL DAILY
Refills: 5 | Status: ACTIVE | COMMUNITY

## 2018-11-28 RX ORDER — AMLODIPINE BESYLATE 2.5 MG/1
2.5 TABLET ORAL DAILY
Qty: 30 | Refills: 0 | Status: ACTIVE | COMMUNITY

## 2018-11-28 RX ORDER — METOPROLOL TARTRATE 25 MG/1
25 TABLET, FILM COATED ORAL
Refills: 0 | Status: ACTIVE | COMMUNITY

## 2018-11-28 RX ORDER — CLOPIDOGREL BISULFATE 75 MG/1
75 TABLET, FILM COATED ORAL DAILY
Qty: 1 | Refills: 6 | Status: ACTIVE | COMMUNITY

## 2018-11-28 RX ORDER — LOSARTAN POTASSIUM AND HYDROCHLOROTHIAZIDE 12.5; 1 MG/1; MG/1
100-12.5 TABLET ORAL DAILY
Qty: 90 | Refills: 3 | Status: COMPLETED | COMMUNITY
End: 2018-11-28

## 2018-11-28 RX ORDER — INSULIN GLARGINE 100 [IU]/ML
100 INJECTION, SOLUTION SUBCUTANEOUS AT BEDTIME
Refills: 0 | Status: ACTIVE | COMMUNITY

## 2018-11-28 RX ORDER — FOLIC ACID 1 MG/1
1 TABLET ORAL
Qty: 30 | Refills: 3 | Status: ACTIVE | COMMUNITY

## 2018-11-28 RX ORDER — METOPROLOL SUCCINATE 25 MG/1
25 TABLET, EXTENDED RELEASE ORAL DAILY
Refills: 0 | Status: COMPLETED | COMMUNITY
End: 2018-11-28

## 2018-11-28 RX ORDER — GLIMEPIRIDE 4 MG/1
4 TABLET ORAL TWICE DAILY
Qty: 60 | Refills: 5 | Status: COMPLETED | COMMUNITY
End: 2018-11-28

## 2018-11-28 RX ORDER — METFORMIN HYDROCHLORIDE 500 MG/1
500 TABLET, COATED ORAL TWICE DAILY
Qty: 60 | Refills: 3 | Status: COMPLETED | COMMUNITY
End: 2018-11-28

## 2018-11-29 VITALS
SYSTOLIC BLOOD PRESSURE: 111 MMHG | TEMPERATURE: 98.7 F | RESPIRATION RATE: 18 BRPM | OXYGEN SATURATION: 98 % | HEART RATE: 89 BPM | DIASTOLIC BLOOD PRESSURE: 71 MMHG

## 2018-11-30 DIAGNOSIS — N18.2 CHRONIC KIDNEY DISEASE, STAGE 2 (MILD): ICD-10-CM

## 2018-11-30 DIAGNOSIS — Z79.4 LONG TERM (CURRENT) USE OF INSULIN: ICD-10-CM

## 2018-11-30 DIAGNOSIS — Z86.73 PERSONAL HISTORY OF TRANSIENT ISCHEMIC ATTACK (TIA), AND CEREBRAL INFARCTION WITHOUT RESIDUAL DEFICITS: ICD-10-CM

## 2018-11-30 DIAGNOSIS — I25.118 ATHEROSCLEROTIC HEART DISEASE OF NATIVE CORONARY ARTERY WITH OTHER FORMS OF ANGINA PECTORIS: ICD-10-CM

## 2018-11-30 DIAGNOSIS — E11.65 TYPE 2 DIABETES MELLITUS WITH HYPERGLYCEMIA: ICD-10-CM

## 2018-11-30 DIAGNOSIS — E87.2 ACIDOSIS: ICD-10-CM

## 2018-11-30 DIAGNOSIS — I50.32 CHRONIC DIASTOLIC (CONGESTIVE) HEART FAILURE: ICD-10-CM

## 2018-11-30 DIAGNOSIS — E11.22 TYPE 2 DIABETES MELLITUS WITH DIABETIC CHRONIC KIDNEY DISEASE: ICD-10-CM

## 2018-11-30 DIAGNOSIS — I25.119 ATHEROSCLEROTIC HEART DISEASE OF NATIVE CORONARY ARTERY WITH UNSPECIFIED ANGINA PECTORIS: ICD-10-CM

## 2018-11-30 DIAGNOSIS — I49.1 ATRIAL PREMATURE DEPOLARIZATION: ICD-10-CM

## 2018-11-30 DIAGNOSIS — E78.5 HYPERLIPIDEMIA, UNSPECIFIED: ICD-10-CM

## 2018-11-30 DIAGNOSIS — I13.0 HYPERTENSIVE HEART AND CHRONIC KIDNEY DISEASE WITH HEART FAILURE AND STAGE 1 THROUGH STAGE 4 CHRONIC KIDNEY DISEASE, OR UNSPECIFIED CHRONIC KIDNEY DISEASE: ICD-10-CM

## 2018-11-30 DIAGNOSIS — Z91.19 PATIENT'S NONCOMPLIANCE WITH OTHER MEDICAL TREATMENT AND REGIMEN: ICD-10-CM

## 2018-12-03 ENCOUNTER — FORM ENCOUNTER (OUTPATIENT)
Age: 68
End: 2018-12-03

## 2018-12-04 ENCOUNTER — OUTPATIENT (OUTPATIENT)
Dept: OUTPATIENT SERVICES | Facility: HOSPITAL | Age: 68
LOS: 1 days | End: 2018-12-04
Payer: MEDICAID

## 2018-12-04 ENCOUNTER — APPOINTMENT (OUTPATIENT)
Dept: CARDIOTHORACIC SURGERY | Facility: CLINIC | Age: 68
End: 2018-12-04
Payer: MEDICAID

## 2018-12-04 VITALS
TEMPERATURE: 97 F | HEART RATE: 78 BPM | OXYGEN SATURATION: 96 % | WEIGHT: 181 LBS | BODY MASS INDEX: 28.35 KG/M2 | RESPIRATION RATE: 18 BRPM | SYSTOLIC BLOOD PRESSURE: 144 MMHG | DIASTOLIC BLOOD PRESSURE: 87 MMHG

## 2018-12-04 DIAGNOSIS — Z86.39 PERSONAL HISTORY OF OTHER ENDOCRINE, NUTRITIONAL AND METABOLIC DISEASE: ICD-10-CM

## 2018-12-04 DIAGNOSIS — Z86.73 PERSONAL HISTORY OF TRANSIENT ISCHEMIC ATTACK (TIA), AND CEREBRAL INFARCTION W/OUT RESIDUAL DEFICITS: ICD-10-CM

## 2018-12-04 DIAGNOSIS — Z09 ENCOUNTER FOR FOLLOW-UP EXAMINATION AFTER COMPLETED TREATMENT FOR CONDITIONS OTHER THAN MALIGNANT NEOPLASM: ICD-10-CM

## 2018-12-04 DIAGNOSIS — Z95.1 PRESENCE OF AORTOCORONARY BYPASS GRAFT: ICD-10-CM

## 2018-12-04 DIAGNOSIS — Z87.891 PERSONAL HISTORY OF NICOTINE DEPENDENCE: ICD-10-CM

## 2018-12-04 DIAGNOSIS — N18.2 CHRONIC KIDNEY DISEASE, STAGE 2 (MILD): ICD-10-CM

## 2018-12-04 DIAGNOSIS — Z86.79 PERSONAL HISTORY OF OTHER DISEASES OF THE CIRCULATORY SYSTEM: ICD-10-CM

## 2018-12-04 PROCEDURE — 71046 X-RAY EXAM CHEST 2 VIEWS: CPT

## 2018-12-04 PROCEDURE — 99024 POSTOP FOLLOW-UP VISIT: CPT

## 2018-12-04 PROCEDURE — 71046 X-RAY EXAM CHEST 2 VIEWS: CPT | Mod: 26

## 2018-12-06 ENCOUNTER — APPOINTMENT (OUTPATIENT)
Dept: ENDOCRINOLOGY | Facility: CLINIC | Age: 68
End: 2018-12-06

## 2018-12-22 DIAGNOSIS — E11.9 TYPE 2 DIABETES MELLITUS W/OUT COMPLICATIONS: ICD-10-CM

## 2018-12-22 RX ORDER — INSULIN LISPRO 200 [IU]/ML
200 INJECTION, SOLUTION SUBCUTANEOUS
Qty: 6 | Refills: 0 | Status: ACTIVE | COMMUNITY
Start: 1900-01-01 | End: 1900-01-01

## 2018-12-26 PROCEDURE — 84295 ASSAY OF SERUM SODIUM: CPT

## 2018-12-26 PROCEDURE — 83605 ASSAY OF LACTIC ACID: CPT

## 2018-12-26 PROCEDURE — 82803 BLOOD GASES ANY COMBINATION: CPT

## 2018-12-26 PROCEDURE — 84100 ASSAY OF PHOSPHORUS: CPT

## 2018-12-26 PROCEDURE — 80053 COMPREHEN METABOLIC PANEL: CPT

## 2018-12-26 PROCEDURE — 94002 VENT MGMT INPAT INIT DAY: CPT

## 2018-12-26 PROCEDURE — 86901 BLOOD TYPING SEROLOGIC RH(D): CPT

## 2018-12-26 PROCEDURE — 84443 ASSAY THYROID STIM HORMONE: CPT

## 2018-12-26 PROCEDURE — 86850 RBC ANTIBODY SCREEN: CPT

## 2018-12-26 PROCEDURE — 83735 ASSAY OF MAGNESIUM: CPT

## 2018-12-26 PROCEDURE — 84132 ASSAY OF SERUM POTASSIUM: CPT

## 2018-12-26 PROCEDURE — 71046 X-RAY EXAM CHEST 2 VIEWS: CPT

## 2018-12-26 PROCEDURE — 93005 ELECTROCARDIOGRAM TRACING: CPT

## 2018-12-26 PROCEDURE — 82330 ASSAY OF CALCIUM: CPT

## 2018-12-26 PROCEDURE — 71045 X-RAY EXAM CHEST 1 VIEW: CPT

## 2018-12-26 PROCEDURE — 85025 COMPLETE CBC W/AUTO DIFF WBC: CPT

## 2018-12-26 PROCEDURE — 82962 GLUCOSE BLOOD TEST: CPT

## 2018-12-26 PROCEDURE — 85027 COMPLETE CBC AUTOMATED: CPT

## 2018-12-26 PROCEDURE — 85610 PROTHROMBIN TIME: CPT

## 2018-12-26 PROCEDURE — P9045: CPT

## 2018-12-26 PROCEDURE — 97161 PT EVAL LOW COMPLEX 20 MIN: CPT

## 2018-12-26 PROCEDURE — 86923 COMPATIBILITY TEST ELECTRIC: CPT

## 2018-12-26 PROCEDURE — 97116 GAIT TRAINING THERAPY: CPT

## 2018-12-26 PROCEDURE — 86900 BLOOD TYPING SEROLOGIC ABO: CPT

## 2018-12-26 PROCEDURE — C1889: CPT

## 2018-12-26 PROCEDURE — 94640 AIRWAY INHALATION TREATMENT: CPT

## 2018-12-26 PROCEDURE — 85730 THROMBOPLASTIN TIME PARTIAL: CPT

## 2018-12-26 PROCEDURE — 36415 COLL VENOUS BLD VENIPUNCTURE: CPT

## 2018-12-26 PROCEDURE — 80048 BASIC METABOLIC PNL TOTAL CA: CPT

## 2020-03-06 NOTE — PATIENT PROFILE ADULT - FUNCTIONAL SCREEN CURRENT LEVEL: BATHING, MLM
----- Message from Lisset Cerda LPN sent at 3/3/2020  3:31 PM CST -----  Surgery 3/31    Patient need Lab,POC and OPOC appt.   0 = independent